# Patient Record
Sex: MALE | Race: WHITE | Employment: FULL TIME | URBAN - METROPOLITAN AREA
[De-identification: names, ages, dates, MRNs, and addresses within clinical notes are randomized per-mention and may not be internally consistent; named-entity substitution may affect disease eponyms.]

---

## 2017-02-27 ENCOUNTER — OFFICE VISIT (OUTPATIENT)
Dept: FAMILY MEDICINE CLINIC | Age: 62
End: 2017-02-27

## 2017-02-27 VITALS
WEIGHT: 225 LBS | DIASTOLIC BLOOD PRESSURE: 75 MMHG | BODY MASS INDEX: 30.48 KG/M2 | HEART RATE: 52 BPM | HEIGHT: 72 IN | SYSTOLIC BLOOD PRESSURE: 120 MMHG

## 2017-02-27 DIAGNOSIS — R53.83 FATIGUE, UNSPECIFIED TYPE: ICD-10-CM

## 2017-02-27 DIAGNOSIS — C09.9 TONSILLAR CANCER (HCC): ICD-10-CM

## 2017-02-27 DIAGNOSIS — I10 ESSENTIAL HYPERTENSION, BENIGN: Primary | ICD-10-CM

## 2017-02-27 DIAGNOSIS — R91.8 LUNG MASS: ICD-10-CM

## 2017-02-27 LAB
A/G RATIO: 1.9 (ref 1.1–2.2)
ALBUMIN SERPL-MCNC: 4.4 G/DL (ref 3.4–5)
ALP BLD-CCNC: 65 U/L (ref 40–129)
ALT SERPL-CCNC: 22 U/L (ref 10–40)
ANION GAP SERPL CALCULATED.3IONS-SCNC: 15 MMOL/L (ref 3–16)
AST SERPL-CCNC: 20 U/L (ref 15–37)
BILIRUB SERPL-MCNC: 0.6 MG/DL (ref 0–1)
BUN BLDV-MCNC: 20 MG/DL (ref 7–20)
CALCIUM SERPL-MCNC: 10.4 MG/DL (ref 8.3–10.6)
CHLORIDE BLD-SCNC: 100 MMOL/L (ref 99–110)
CHOLESTEROL, TOTAL: 154 MG/DL (ref 0–199)
CO2: 26 MMOL/L (ref 21–32)
CREAT SERPL-MCNC: 0.8 MG/DL (ref 0.8–1.3)
GFR AFRICAN AMERICAN: >60
GFR NON-AFRICAN AMERICAN: >60
GLOBULIN: 2.3 G/DL
GLUCOSE BLD-MCNC: 101 MG/DL (ref 70–99)
HDLC SERPL-MCNC: 51 MG/DL (ref 40–60)
LDL CHOLESTEROL CALCULATED: 81 MG/DL
POTASSIUM SERPL-SCNC: 4 MMOL/L (ref 3.5–5.1)
SODIUM BLD-SCNC: 141 MMOL/L (ref 136–145)
TOTAL PROTEIN: 6.7 G/DL (ref 6.4–8.2)
TRIGL SERPL-MCNC: 112 MG/DL (ref 0–150)
TSH REFLEX: 1.95 UIU/ML (ref 0.27–4.2)
VLDLC SERPL CALC-MCNC: 22 MG/DL

## 2017-02-27 PROCEDURE — 36415 COLL VENOUS BLD VENIPUNCTURE: CPT | Performed by: FAMILY MEDICINE

## 2017-02-27 PROCEDURE — 99214 OFFICE O/P EST MOD 30 MIN: CPT | Performed by: FAMILY MEDICINE

## 2017-02-27 RX ORDER — AMLODIPINE BESYLATE 5 MG/1
TABLET ORAL
Qty: 90 TABLET | Refills: 3 | Status: SHIPPED | OUTPATIENT
Start: 2017-02-27 | End: 2017-02-27 | Stop reason: SDUPTHER

## 2017-02-27 RX ORDER — AMLODIPINE BESYLATE 5 MG/1
TABLET ORAL
Qty: 90 TABLET | Refills: 3 | Status: SHIPPED | OUTPATIENT
Start: 2017-02-27 | End: 2018-02-17 | Stop reason: SDUPTHER

## 2017-04-20 ENCOUNTER — OFFICE VISIT (OUTPATIENT)
Dept: DERMATOLOGY | Age: 62
End: 2017-04-20

## 2017-04-20 DIAGNOSIS — L57.0 AK (ACTINIC KERATOSIS): Primary | ICD-10-CM

## 2017-04-20 PROCEDURE — 17000 DESTRUCT PREMALG LESION: CPT | Performed by: DERMATOLOGY

## 2017-04-20 PROCEDURE — 17003 DESTRUCT PREMALG LES 2-14: CPT | Performed by: DERMATOLOGY

## 2017-05-29 DIAGNOSIS — I10 ESSENTIAL HYPERTENSION, BENIGN: ICD-10-CM

## 2017-05-30 RX ORDER — TRIAMTERENE AND HYDROCHLOROTHIAZIDE 37.5; 25 MG/1; MG/1
CAPSULE ORAL
Qty: 90 CAPSULE | Refills: 3 | Status: SHIPPED | OUTPATIENT
Start: 2017-05-30 | End: 2018-05-02 | Stop reason: SDUPTHER

## 2017-08-28 ENCOUNTER — OFFICE VISIT (OUTPATIENT)
Dept: FAMILY MEDICINE CLINIC | Age: 62
End: 2017-08-28

## 2017-08-28 VITALS
HEIGHT: 72 IN | WEIGHT: 250 LBS | DIASTOLIC BLOOD PRESSURE: 67 MMHG | SYSTOLIC BLOOD PRESSURE: 110 MMHG | BODY MASS INDEX: 33.86 KG/M2 | HEART RATE: 56 BPM

## 2017-08-28 DIAGNOSIS — C09.9 TONSILLAR CANCER (HCC): ICD-10-CM

## 2017-08-28 DIAGNOSIS — E66.09 NON MORBID OBESITY DUE TO EXCESS CALORIES: ICD-10-CM

## 2017-08-28 DIAGNOSIS — I10 ESSENTIAL HYPERTENSION, BENIGN: Primary | ICD-10-CM

## 2017-08-28 LAB
A/G RATIO: 1.9 (ref 1.1–2.2)
ALBUMIN SERPL-MCNC: 4.5 G/DL (ref 3.4–5)
ALP BLD-CCNC: 69 U/L (ref 40–129)
ALT SERPL-CCNC: 26 U/L (ref 10–40)
ANION GAP SERPL CALCULATED.3IONS-SCNC: 14 MMOL/L (ref 3–16)
AST SERPL-CCNC: 24 U/L (ref 15–37)
BILIRUB SERPL-MCNC: 0.9 MG/DL (ref 0–1)
BUN BLDV-MCNC: 21 MG/DL (ref 7–20)
CALCIUM SERPL-MCNC: 10.4 MG/DL (ref 8.3–10.6)
CHLORIDE BLD-SCNC: 100 MMOL/L (ref 99–110)
CO2: 26 MMOL/L (ref 21–32)
CREAT SERPL-MCNC: 0.8 MG/DL (ref 0.8–1.3)
GFR AFRICAN AMERICAN: >60
GFR NON-AFRICAN AMERICAN: >60
GLOBULIN: 2.4 G/DL
GLUCOSE BLD-MCNC: 102 MG/DL (ref 70–99)
POTASSIUM SERPL-SCNC: 4.1 MMOL/L (ref 3.5–5.1)
SODIUM BLD-SCNC: 140 MMOL/L (ref 136–145)
TOTAL PROTEIN: 6.9 G/DL (ref 6.4–8.2)

## 2017-08-28 PROCEDURE — 99214 OFFICE O/P EST MOD 30 MIN: CPT | Performed by: FAMILY MEDICINE

## 2017-08-28 PROCEDURE — 36415 COLL VENOUS BLD VENIPUNCTURE: CPT | Performed by: FAMILY MEDICINE

## 2017-10-10 ASSESSMENT — LIFESTYLE VARIABLES: SMOKING_STATUS: 0

## 2017-10-11 ENCOUNTER — HOSPITAL ENCOUNTER (OUTPATIENT)
Dept: ENDOSCOPY | Age: 62
Discharge: HOME | End: 2017-10-11
Attending: INTERNAL MEDICINE | Admitting: INTERNAL MEDICINE

## 2017-10-11 VITALS
TEMPERATURE: 97.1 F | RESPIRATION RATE: 16 BRPM | WEIGHT: 237 LBS | BODY MASS INDEX: 33.18 KG/M2 | HEIGHT: 71 IN | DIASTOLIC BLOOD PRESSURE: 84 MMHG | OXYGEN SATURATION: 99 % | SYSTOLIC BLOOD PRESSURE: 129 MMHG | HEART RATE: 49 BPM

## 2017-10-11 RX ORDER — SODIUM CHLORIDE 0.9 % (FLUSH) 0.9 %
10 SYRINGE (ML) INJECTION PRN
Status: DISCONTINUED | OUTPATIENT
Start: 2017-10-11 | End: 2017-10-12 | Stop reason: HOSPADM

## 2017-10-11 RX ORDER — SODIUM CHLORIDE 0.9 % (FLUSH) 0.9 %
10 SYRINGE (ML) INJECTION EVERY 12 HOURS SCHEDULED
Status: DISCONTINUED | OUTPATIENT
Start: 2017-10-11 | End: 2017-10-12 | Stop reason: HOSPADM

## 2017-10-11 RX ORDER — SODIUM CHLORIDE 9 MG/ML
INJECTION, SOLUTION INTRAVENOUS CONTINUOUS
Status: DISCONTINUED | OUTPATIENT
Start: 2017-10-11 | End: 2017-10-12 | Stop reason: HOSPADM

## 2017-10-11 RX ADMIN — SODIUM CHLORIDE: 9 INJECTION, SOLUTION INTRAVENOUS at 11:00

## 2017-10-11 ASSESSMENT — PAIN SCALES - WONG BAKER: WONGBAKER_NUMERICALRESPONSE: 0

## 2017-10-11 ASSESSMENT — PAIN SCALES - GENERAL
PAINLEVEL_OUTOF10: 0
PAINLEVEL_OUTOF10: 0

## 2017-10-11 ASSESSMENT — PAIN - FUNCTIONAL ASSESSMENT: PAIN_FUNCTIONAL_ASSESSMENT: 0-10

## 2017-10-11 NOTE — ANESTHESIA POST-OP
Anesthesia Post-op Note    Patient: Chris Ndiaye  MRN: [id number]  YOB: 1955  Date of evaluation: 10/11/2017  Time:  12:55 PM       [facility] Department of Anesthesiology  Post-Anesthesia Note       Name:  Chris Ndiaye                                  Age:  58 y.o. MRN:  [id number]     Last Vitals & Oxygen Saturation: /84   Pulse (!) 49   Temp 97.1 °F (36.2 °C) (Temporal)   Resp 16   Ht 5' 11\" (1.803 m)   Wt 237 lb (107.5 kg)   SpO2 99%   BMI 33.05 kg/m²   Patient Vitals for the past 4 hrs:   BP Temp Temp src Pulse Resp SpO2 Height Weight   10/11/17 1246 129/84 - - (!) 49 16 99 % - -   10/11/17 1236 126/75 - - 53 16 99 % - -   10/11/17 1226 106/60 97.1 °F (36.2 °C) Temporal 50 16 98 % - -   10/11/17 1048 (!) 147/75 98.2 °F (36.8 °C) Temporal 64 16 97 % 5' 11\" (1.803 m) 237 lb (107.5 kg)       Level of consciousness:  Awake, alert    Respiratory: Respirations easy, no distress. Stable. Cardiovascular: Hemodynamically stable. Hydration: Adequate. PONV: Adequately managed. Post-op pain: Adequately controlled. Post-op assessment: Tolerated anesthetic well without complication. Complications:  None.     Gaye Talley MD  October 11, 2017   12:56 PM      Anesthesia Post Evaluation    Gaye Talley MD  12:55 PM

## 2017-10-11 NOTE — OP NOTE
Colonoscopy Procedure Note      Patient: Zora Barrios  : 1955  Acct#:     Procedure: Colonoscopy     Date:  10/11/2017    Surgeon:  Zoey Méndez DO    Referring Physician:  Dillon Kenny MD    Previous Colonoscopy: NO  Date: N/A  Greater than 3 years: N/A    Preoperative Diagnosis:  1) Screening colonoscopy- Average risk     Postoperative Diagnosis:  1) The visualized colonic mucosa was without visible abnormalities. Specifically, no colon polyps were identified. 2) Mild left sided diverticulosis was noted  3) Small internal hemorrhoids were noted. Consent:  The patient or their legal guardian has signed a consent, and is aware of the potential risks, benefits, alternatives, and potential complications of this procedure. These include, but are not limited to hemorrhage, bleeding, post procedural pain, perforation, phlebitis, aspiration, hypotension, hypoxia, cardiovascular events such as arryhthmia, and possibly death. Additionally, the possibility of missed colonic polyps and interval colon cancer was discussed in the consent. Anesthesia:  The patient was administered TIVA per anesthesiology team.  Please see their operative records for full details of medications administered. Procedure: An informed consent was obtained from the patient after explanation of indications, benefits, possible risks and complications of the procedure. The patient was then taken to the endoscopy suite, placed in the left lateral decubitus position, and the above IV anesthesia was administered. A digital rectal examination was performed and revealed negative without mass, lesions or tenderness, internal hemorrhoids noted. The Olympus video colonoscope was placed in the patient's rectum under digital direction and advanced to the cecum. The cecum was identified by characteristic anatomy and ballottment. The prep was good. The ileocecal valve was identified.      The terminal ileum was not visualized. The scope was then withdrawn back through the cecum, ascending, transverse, descending, sigmoid colon, and rectum. Careful circumferential examination of the mucosa in these areas demonstrated:    1) The visualized colonic mucosa was without visible abnormalities. Specifically, no colon polyps were identified. 2) Mild left sided diverticulosis was noted      The scope was then withdrawn into the rectum and retroflexed. The retroflexed view of the anal verge and rectum demonstrates small internal hemorrhoids. The scope was straightened, the colon was decompressed and the scope was withdrawn from the patient. The patient tolerated the procedure well and was taken to the PACU in good condition. Estimated blood loss: None    Impression:  See post-procedure diagnoses. Recommendations:  Repeat colonoscopy in 10 years.     Durell Harada, DO  [address] [place] [address]  10/11/2017  [phone]

## 2017-10-11 NOTE — ANESTHESIA PRE-OP
%  Min: 97 %   Min taken time: 10/11/17 1048  Max: 97 %   Max taken time: 10/11/17 1048    BP Readings from Last 3 Encounters:   10/11/17 (!) 147/75   08/28/17 110/67   02/27/17 120/75     BMI  Body mass index is 33.05 kg/m². Estimated body mass index is 33.05 kg/m² as calculated from the following:    Height as of this encounter: 5' 11\" (1.803 m). Weight as of this encounter: 237 lb (107.5 kg). CBC   Lab Results   Component Value Date    WBC 4.1 08/24/2016    RBC 4.45 08/24/2016    HGB 13.6 08/24/2016    HCT 39.0 08/24/2016    MCV 87.7 08/24/2016    RDW 12.6 08/24/2016     08/24/2016     CMP    Lab Results   Component Value Date     08/28/2017    K 4.1 08/28/2017     08/28/2017    CO2 26 08/28/2017    BUN 21 08/28/2017    CREATININE 0.8 08/28/2017    GFRAA >60 08/28/2017    AGRATIO 1.9 08/28/2017    LABGLOM >60 08/28/2017    GLUCOSE 102 08/28/2017    PROT 6.9 08/28/2017    CALCIUM 10.4 08/28/2017    BILITOT 0.9 08/28/2017    ALKPHOS 69 08/28/2017    AST 24 08/28/2017    ALT 26 08/28/2017     BMP    Lab Results   Component Value Date     08/28/2017    K 4.1 08/28/2017     08/28/2017    CO2 26 08/28/2017    BUN 21 08/28/2017    CREATININE 0.8 08/28/2017    CALCIUM 10.4 08/28/2017    GFRAA >60 08/28/2017    LABGLOM >60 08/28/2017    GLUCOSE 102 08/28/2017     POCGlucose  No results for input(s): GLUCOSE in the last 72 hours.    Coags  No results found for: PROTIME, INR, APTT  HCG (If Applicable) No results found for: PREGTESTUR, PREGSERUM, HCG, HCGQUANT   ABGs No results found for: PHART, PO2ART, VFU9QRH, XFT1HXY, BEART, W9XWMZQX   Type & Screen (If Applicable)  No results found for: LABABO, LABRH                         BMI: Wt Readings from Last 3 Encounters:       NPO Status:   Date of last liquid consumption: 10/11/17   Time of last liquid consumption: 0630   Date of last solid food consumption: 10/09/17              Anesthesia Evaluation  Patient summary reviewed no history of anesthetic complications:   Airway: Mallampati: II  TM distance: >3 FB   Neck ROM: full  Mouth opening: > = 3 FB Dental:    (+) partials      Pulmonary: breath sounds clear to auscultation      (-) COPD, asthma, recent URI and sleep apnea                           Cardiovascular:    (+) hypertension:,     (-) past MI, CABG/stent, dysrhythmias and  angina      Rhythm: regular  Rate: normal           Beta Blocker:  Not on Beta Blocker         Neuro/Psych:      (-) seizures, TIA and CVA           GI/Hepatic/Renal:        (-) GERD and liver disease       Endo/Other:    (+) : arthritis:. (-) hypothyroidism, no Type II DM               Abdominal:           Vascular:                                    Anesthesia Plan      MAC     ASA 3             Anesthetic plan and risks discussed with patient. Plan discussed with CRNA. This pre-anesthesia assessment may be used as a history and physical.    DOS STAFF ADDENDUM:    Pt seen and examined, chart reviewed (including anesthesia, drug and allergy history). No interval changes to history and physical examination. Anesthetic plan, risks, benefits, alternatives, and personnel involved discussed with patient. Patient verbalized an understanding and agrees to proceed.       Duane Connor, MD  October 11, 2017  11:03 AM

## 2018-02-17 DIAGNOSIS — I10 ESSENTIAL HYPERTENSION, BENIGN: ICD-10-CM

## 2018-02-19 RX ORDER — AMLODIPINE BESYLATE 5 MG/1
TABLET ORAL
Qty: 90 TABLET | Refills: 1 | Status: SHIPPED | OUTPATIENT
Start: 2018-02-19 | End: 2018-08-03 | Stop reason: SDUPTHER

## 2018-03-06 ENCOUNTER — OFFICE VISIT (OUTPATIENT)
Dept: FAMILY MEDICINE CLINIC | Age: 63
End: 2018-03-06

## 2018-03-06 VITALS
HEART RATE: 58 BPM | SYSTOLIC BLOOD PRESSURE: 130 MMHG | DIASTOLIC BLOOD PRESSURE: 75 MMHG | BODY MASS INDEX: 33.86 KG/M2 | HEIGHT: 72 IN | WEIGHT: 250 LBS

## 2018-03-06 DIAGNOSIS — C09.9 TONSILLAR CANCER (HCC): ICD-10-CM

## 2018-03-06 DIAGNOSIS — I10 ESSENTIAL HYPERTENSION, BENIGN: Primary | ICD-10-CM

## 2018-03-06 DIAGNOSIS — F43.21 ADJUSTMENT DISORDER WITH DEPRESSED MOOD: ICD-10-CM

## 2018-03-06 DIAGNOSIS — E66.09 NON MORBID OBESITY DUE TO EXCESS CALORIES: ICD-10-CM

## 2018-03-06 LAB
A/G RATIO: 2 (ref 1.1–2.2)
ALBUMIN SERPL-MCNC: 4.5 G/DL (ref 3.4–5)
ALP BLD-CCNC: 61 U/L (ref 40–129)
ALT SERPL-CCNC: 28 U/L (ref 10–40)
ANION GAP SERPL CALCULATED.3IONS-SCNC: 14 MMOL/L (ref 3–16)
AST SERPL-CCNC: 24 U/L (ref 15–37)
BILIRUB SERPL-MCNC: 0.6 MG/DL (ref 0–1)
BUN BLDV-MCNC: 19 MG/DL (ref 7–20)
CALCIUM SERPL-MCNC: 10 MG/DL (ref 8.3–10.6)
CHLORIDE BLD-SCNC: 99 MMOL/L (ref 99–110)
CO2: 25 MMOL/L (ref 21–32)
CREAT SERPL-MCNC: 0.8 MG/DL (ref 0.8–1.3)
GFR AFRICAN AMERICAN: >60
GFR NON-AFRICAN AMERICAN: >60
GLOBULIN: 2.3 G/DL
GLUCOSE BLD-MCNC: 92 MG/DL (ref 70–99)
POTASSIUM SERPL-SCNC: 3.9 MMOL/L (ref 3.5–5.1)
SODIUM BLD-SCNC: 138 MMOL/L (ref 136–145)
TOTAL PROTEIN: 6.8 G/DL (ref 6.4–8.2)

## 2018-03-06 PROCEDURE — G8484 FLU IMMUNIZE NO ADMIN: HCPCS | Performed by: FAMILY MEDICINE

## 2018-03-06 PROCEDURE — G8417 CALC BMI ABV UP PARAM F/U: HCPCS | Performed by: FAMILY MEDICINE

## 2018-03-06 PROCEDURE — 3017F COLORECTAL CA SCREEN DOC REV: CPT | Performed by: FAMILY MEDICINE

## 2018-03-06 PROCEDURE — G8427 DOCREV CUR MEDS BY ELIG CLIN: HCPCS | Performed by: FAMILY MEDICINE

## 2018-03-06 PROCEDURE — 99214 OFFICE O/P EST MOD 30 MIN: CPT | Performed by: FAMILY MEDICINE

## 2018-03-06 PROCEDURE — 1036F TOBACCO NON-USER: CPT | Performed by: FAMILY MEDICINE

## 2018-03-06 PROCEDURE — 36415 COLL VENOUS BLD VENIPUNCTURE: CPT | Performed by: FAMILY MEDICINE

## 2018-03-06 ASSESSMENT — PATIENT HEALTH QUESTIONNAIRE - PHQ9
2. FEELING DOWN, DEPRESSED OR HOPELESS: 0
SUM OF ALL RESPONSES TO PHQ QUESTIONS 1-9: 0
1. LITTLE INTEREST OR PLEASURE IN DOING THINGS: 0
SUM OF ALL RESPONSES TO PHQ9 QUESTIONS 1 & 2: 0

## 2018-03-06 ASSESSMENT — ENCOUNTER SYMPTOMS
VOMITING: 0
COUGH: 0
DIARRHEA: 0
CONSTIPATION: 0
NAUSEA: 0
SHORTNESS OF BREATH: 0
ABDOMINAL PAIN: 0

## 2018-03-06 NOTE — PROGRESS NOTES
Chief Complaint   Patient presents with    Hypertension         HPI:  Nichelle Corona is a 58 y.o. (: 1955) here today   for multiple medical problems. 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 Has been eating well and notes no trouble with food sticking in his throat or neck pain. He does admit his mood has been depressed as his son  2017. He was chronically ill but the death was sudden he notes he still able to function just doesn't care much about weight loss at this time. Denies suicidal or homicidal ideation    Review of Systems   Constitutional: Negative for chills and fever. Respiratory: Negative for cough and shortness of breath. Cardiovascular: Negative for chest pain and palpitations. Gastrointestinal: Negative for abdominal pain, constipation, diarrhea, nausea and vomiting. Endocrine: Negative for polyuria. Genitourinary: Negative for dysuria.        Past Medical History:   Diagnosis Date    Cancer [facility])     skin : [facility] Dr. Jose Ramon Phan Elevated liver enzymes 2014    Essential hypertension, benign     Lung mass 16    REpeat CT  improved  follows Dr. 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 Rafy Kimbrough Tonsillar cancer [facility]) 2015    [facility] - Dr. Ray Mcardle s/p chemo/rad     Family History   Problem Relation Age of Onset    Cancer Mother      Breast/Skin    Heart Disease Mother     Osteoporosis Mother     Other Father       78 suddenly/gout    Heart Disease Father      Social History     Social History    Marital status:      Spouse name: Lonita Litten Number of children: 2    Years of education: N/A     Occupational History    : [employer]      Social History Main Topics    Smoking status: Former Smoker     Quit date: 8/15/1983    Smokeless tobacco: Never Used    Alcohol use 0.0 oz/week      Comment: once a week    Drug use: No    Sexual activity: Not on file     Other Topics Concern    Not on file     Social History Narrative    No narrative on file       New Prescriptions    No medications on file         Meds Prior to visit:  Prior to Visit Medications    Medication Sig Taking? Authorizing Provider   amLODIPine (NORVASC) 5 MG tablet TAKE 1 TABLET BY MOUTH  DAILY Yes Ann Sotomayor MD   triamterene-hydrochlorothiazide (DYAZIDE) 37.5-25 MG per capsule Take 1 capsule by mouth  daily Yes Ann Sotomayor MD   Omega-3 Fatty Acids 1200 MG CAPS 1 EVERY DAY Yes Historical Provider, MD   Docusate Sodium 100 MG TABS 2 Every Morning Yes Historical Provider, MD   therapeutic multivitamin-minerals (THERAGRAN-M) tablet Take 1 tablet by mouth daily. Yes Historical Provider, MD     Allergies   Allergen Reactions    Neosporin [Neomycin-Polymyx-Gramicid]        OBJECTIVE:    /75   Pulse 58   Ht 5' 11.5\" (1.816 m)   Wt 250 lb (113.4 kg)   BMI 34.38 kg/m²   BP Readings from Last 2 Encounters:   18 130/75   10/11/17 129/84     Wt Readings from Last 3 Encounters:   18 250 lb (113.4 kg)   10/11/17 237 lb (107.5 kg)   10/05/17 245 lb (111.1 kg)       Physical Exam   Constitutional: He appears well-developed and well-nourished. Obese   Cardiovascular: Normal rate and regular rhythm. Exam reveals no gallop and no friction rub. No murmur heard. Pulmonary/Chest: Effort normal and breath sounds normal. He has no wheezes. He has no rales. Abdominal: Soft.  Bowel sounds are normal. He exhibits no distension and no mass. There is no tenderness. Skin: Skin is warm and dry. No rash noted. Vitals reviewed. LDL Calculated (mg/dL)   Date Value   02/27/2017 81       ASSESSMENT/PLAN:    1. Essential hypertension, benign  Controlled: Appears stable. We will continue current management and monitor for adverse reaction and disease progression. Follow-up as noted below    - Comprehensive Metabolic Panel    2. Tonsillar cancer ([address]  Appears stable: Keep follow-up with radiation oncology in April and his ENT in October    3. Non morbid obesity due to excess calories  Counseled patient to eat mindfully follow-up in 6 months    4. Adjustment disorder with depressed mood  Patient declines antidepressants today. Gave some brief counseling follow-up 6 months sooner if needed      RTC 6 months    Scribe attestation:  Delmy Jackson MA, am scribing for and in the presence of Galen Dejesus MD. Electronically signed by Diane Merino MA on 3/6/2018 at 4:16 PM            Provider attestation: Holly Robledo MD  personally performed the services described in this documentation, as scribed by the user listed above in my presence, and it is both accurate and complete. I agree with the Chief Complaint, ROS, and Past Histories independently gathered by the clinical support staff and the remaining scribed note accurately describes my personal service to the patient.     3/6/2018    4:46 PM

## 2018-04-23 ENCOUNTER — OFFICE VISIT (OUTPATIENT)
Dept: DERMATOLOGY | Age: 63
End: 2018-04-23

## 2018-04-23 DIAGNOSIS — L82.1 SK (SEBORRHEIC KERATOSIS): Primary | ICD-10-CM

## 2018-04-23 DIAGNOSIS — L57.0 AK (ACTINIC KERATOSIS): ICD-10-CM

## 2018-04-23 DIAGNOSIS — L30.8 ASTEATOTIC DERMATITIS: ICD-10-CM

## 2018-04-23 PROCEDURE — G8427 DOCREV CUR MEDS BY ELIG CLIN: HCPCS | Performed by: DERMATOLOGY

## 2018-04-23 PROCEDURE — G8417 CALC BMI ABV UP PARAM F/U: HCPCS | Performed by: DERMATOLOGY

## 2018-04-23 PROCEDURE — 3017F COLORECTAL CA SCREEN DOC REV: CPT | Performed by: DERMATOLOGY

## 2018-04-23 PROCEDURE — 1036F TOBACCO NON-USER: CPT | Performed by: DERMATOLOGY

## 2018-04-23 PROCEDURE — 99213 OFFICE O/P EST LOW 20 MIN: CPT | Performed by: DERMATOLOGY

## 2018-04-23 RX ORDER — TRIAMCINOLONE ACETONIDE 1 MG/G
CREAM TOPICAL
Qty: 80 G | Refills: 1 | Status: SHIPPED | OUTPATIENT
Start: 2018-04-23 | End: 2019-09-05 | Stop reason: ALTCHOICE

## 2018-05-02 DIAGNOSIS — I10 ESSENTIAL HYPERTENSION, BENIGN: ICD-10-CM

## 2018-05-03 RX ORDER — TRIAMTERENE AND HYDROCHLOROTHIAZIDE 37.5; 25 MG/1; MG/1
CAPSULE ORAL
Qty: 90 CAPSULE | Refills: 1 | Status: SHIPPED | OUTPATIENT
Start: 2018-05-03 | End: 2018-10-15 | Stop reason: SDUPTHER

## 2018-08-03 DIAGNOSIS — I10 ESSENTIAL HYPERTENSION, BENIGN: ICD-10-CM

## 2018-08-05 RX ORDER — AMLODIPINE BESYLATE 5 MG/1
TABLET ORAL
Qty: 90 TABLET | Refills: 0 | Status: SHIPPED | OUTPATIENT
Start: 2018-08-05 | End: 2018-09-06 | Stop reason: SDUPTHER

## 2018-08-29 ENCOUNTER — TELEPHONE (OUTPATIENT)
Dept: FAMILY MEDICINE CLINIC | Age: 63
End: 2018-08-29

## 2018-09-06 ENCOUNTER — OFFICE VISIT (OUTPATIENT)
Dept: FAMILY MEDICINE CLINIC | Age: 63
End: 2018-09-06

## 2018-09-06 VITALS
SYSTOLIC BLOOD PRESSURE: 132 MMHG | BODY MASS INDEX: 35.7 KG/M2 | DIASTOLIC BLOOD PRESSURE: 74 MMHG | WEIGHT: 255 LBS | HEART RATE: 58 BPM | HEIGHT: 71 IN

## 2018-09-06 DIAGNOSIS — E66.09 NON MORBID OBESITY DUE TO EXCESS CALORIES: ICD-10-CM

## 2018-09-06 DIAGNOSIS — I10 ESSENTIAL HYPERTENSION, BENIGN: Primary | ICD-10-CM

## 2018-09-06 DIAGNOSIS — C09.9 TONSILLAR CANCER (HCC): ICD-10-CM

## 2018-09-06 DIAGNOSIS — Z23 NEED FOR VACCINATION: ICD-10-CM

## 2018-09-06 LAB
A/G RATIO: 2 (ref 1.1–2.2)
ALBUMIN SERPL-MCNC: 4.7 G/DL (ref 3.4–5)
ALP BLD-CCNC: 64 U/L (ref 40–129)
ALT SERPL-CCNC: 23 U/L (ref 10–40)
ANION GAP SERPL CALCULATED.3IONS-SCNC: 12 MMOL/L (ref 3–16)
AST SERPL-CCNC: 21 U/L (ref 15–37)
BILIRUB SERPL-MCNC: 0.6 MG/DL (ref 0–1)
BUN BLDV-MCNC: 21 MG/DL (ref 7–20)
CALCIUM SERPL-MCNC: 10.5 MG/DL (ref 8.3–10.6)
CHLORIDE BLD-SCNC: 102 MMOL/L (ref 99–110)
CHOLESTEROL, TOTAL: 159 MG/DL (ref 0–199)
CO2: 27 MMOL/L (ref 21–32)
CREAT SERPL-MCNC: 0.9 MG/DL (ref 0.8–1.3)
GFR AFRICAN AMERICAN: >60
GFR NON-AFRICAN AMERICAN: >60
GLOBULIN: 2.3 G/DL
GLUCOSE BLD-MCNC: 98 MG/DL (ref 70–99)
HDLC SERPL-MCNC: 44 MG/DL (ref 40–60)
LDL CHOLESTEROL CALCULATED: 88 MG/DL
POTASSIUM SERPL-SCNC: 3.9 MMOL/L (ref 3.5–5.1)
SODIUM BLD-SCNC: 141 MMOL/L (ref 136–145)
TOTAL PROTEIN: 7 G/DL (ref 6.4–8.2)
TRIGL SERPL-MCNC: 134 MG/DL (ref 0–150)
VLDLC SERPL CALC-MCNC: 27 MG/DL

## 2018-09-06 PROCEDURE — 99214 OFFICE O/P EST MOD 30 MIN: CPT | Performed by: FAMILY MEDICINE

## 2018-09-06 PROCEDURE — 36415 COLL VENOUS BLD VENIPUNCTURE: CPT | Performed by: FAMILY MEDICINE

## 2018-09-06 PROCEDURE — 3017F COLORECTAL CA SCREEN DOC REV: CPT | Performed by: FAMILY MEDICINE

## 2018-09-06 PROCEDURE — G8427 DOCREV CUR MEDS BY ELIG CLIN: HCPCS | Performed by: FAMILY MEDICINE

## 2018-09-06 PROCEDURE — 1036F TOBACCO NON-USER: CPT | Performed by: FAMILY MEDICINE

## 2018-09-06 PROCEDURE — G8417 CALC BMI ABV UP PARAM F/U: HCPCS | Performed by: FAMILY MEDICINE

## 2018-09-06 RX ORDER — AMLODIPINE BESYLATE 5 MG/1
5 TABLET ORAL DAILY
Qty: 90 TABLET | Refills: 3 | Status: SHIPPED | OUTPATIENT
Start: 2018-09-06 | End: 2018-10-24 | Stop reason: SDUPTHER

## 2018-09-06 ASSESSMENT — ENCOUNTER SYMPTOMS
NAUSEA: 0
ABDOMINAL PAIN: 0
DIARRHEA: 0
COUGH: 0
CONSTIPATION: 0
SHORTNESS OF BREATH: 0
VOMITING: 0

## 2018-09-06 NOTE — PROGRESS NOTES
Chief Complaint   Patient presents with    Cancer     tonsilar    Hypertension    Obesity         HPI:  Arielle Pearl is a 61 y.o. (: 1955) here today   for review of multiple medical problems. He is compliant with his blood pressure medications  without Lightheadedness, Urinary Frequency, Edema and Sedation  He is not checking Home Blood Pressures. He notes he starting to consider working on his diet. Notes he has been keeping f/u with ENT and Radiation Onc for his tonsillar cancer. He has been having no problems swallowing. Review of Systems   Constitutional: Negative for chills and fever. Respiratory: Negative for cough and shortness of breath. Cardiovascular: Negative for chest pain and palpitations. Gastrointestinal: Negative for abdominal pain, constipation, diarrhea, nausea and vomiting. Endocrine: Negative for polyuria. Genitourinary: Negative for dysuria.        Past Medical History:   Diagnosis Date    Adjustment disorder with depressed mood 3/6/2018    Cancer ([facility] [address]     skin : [facility] Dr. Pina Yuan Elevated liver enzymes 2014    Essential hypertension, benign     Lung mass 16    REpeat CT  improved  follows Dr. 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 Nelly Rodriguez Tonsillar cancer [facility]) 2015    [facility] - Dr. Murtaza Lang s/p chemo/rad     Family History   Problem Relation Age of Onset    Cancer Mother         Breast/Skin    Heart Disease Mother     Osteoporosis Mother     Other Father          78 suddenly/gout    Heart Disease Father     Other Son         Chronically ill:  2017     Social History     Social History    Marital status:      Spouse name: Ana Score Number of children: 2    Years of education: N/A     Occupational History    : [employer]      Social History Main Topics    Smoking status: Former Smoker     Quit date: 8/15/1983    Smokeless tobacco: Never Used    Alcohol use 0.0 oz/week      Comment: once a week    Drug use: No    Sexual activity: Not on file     Other Topics Concern    Not on file     Social History Narrative    No narrative on file       New Prescriptions    No medications on file         Meds Prior to visit:  Prior to Visit Medications    Medication Sig Taking? Authorizing Provider   amLODIPine (NORVASC) 5 MG tablet TAKE 1 TABLET BY MOUTH  DAILY Yes Mark Ozuna MD   triamterene-hydrochlorothiazide (DYAZIDE) 37.5-25 MG per capsule TAKE 1 CAPSULE BY MOUTH  DAILY Yes Aurea Pérez MD   triamcinolone (KENALOG) 0.1 % cream Apply to affected area twice daily for up to 2 weeks or until improved. Yes Kristan Goltz, MD   Omega-3 Fatty Acids 1200 MG CAPS 1 EVERY DAY Yes Historical Provider, MD   Docusate Sodium 100 MG TABS 2 Every Morning Yes Historical Provider, MD   therapeutic multivitamin-minerals (THERAGRAN-M) tablet Take 1 tablet by mouth daily. Yes Historical Provider, MD     Allergies   Allergen Reactions    Neosporin [Neomycin-Polymyx-Gramicid]        OBJECTIVE:    /74   Pulse 58   Ht 5' 11\" (1.803 m)   Wt 255 lb (115.7 kg)   BMI 35.57 kg/m²   BP Readings from Last 2 Encounters:   09/06/18 132/74   03/06/18 130/75     Wt Readings from Last 3 Encounters:   09/06/18 255 lb (115.7 kg)   03/06/18 250 lb (113.4 kg)   10/11/17 237 lb (107.5 kg)       Physical Exam   Constitutional: He appears well-developed and well-nourished.    HENT:   Right Ear: Tympanic membrane and ear canal normal.   Left Ear: Tympanic membrane and ear canal normal.   Nose: Nose normal.   Mouth/Throat: Uvula is midline, oropharynx is clear and moist and mucous membranes are normal.   Nares Pink and Patent,   Eyes: Pupils are equal, round,

## 2018-10-15 DIAGNOSIS — I10 ESSENTIAL HYPERTENSION, BENIGN: ICD-10-CM

## 2018-10-16 RX ORDER — TRIAMTERENE AND HYDROCHLOROTHIAZIDE 37.5; 25 MG/1; MG/1
CAPSULE ORAL
Qty: 90 CAPSULE | Refills: 3 | Status: SHIPPED | OUTPATIENT
Start: 2018-10-16 | End: 2020-03-05 | Stop reason: SDUPTHER

## 2018-10-24 DIAGNOSIS — I10 ESSENTIAL HYPERTENSION, BENIGN: ICD-10-CM

## 2018-10-25 RX ORDER — AMLODIPINE BESYLATE 5 MG/1
5 TABLET ORAL DAILY
Qty: 90 TABLET | Refills: 1 | Status: SHIPPED | OUTPATIENT
Start: 2018-10-25 | End: 2019-05-24 | Stop reason: SDUPTHER

## 2019-06-13 ENCOUNTER — OFFICE VISIT (OUTPATIENT)
Dept: DERMATOLOGY | Age: 64
End: 2019-06-13
Payer: COMMERCIAL

## 2019-06-13 DIAGNOSIS — L57.0 AK (ACTINIC KERATOSIS): Primary | ICD-10-CM

## 2019-06-13 DIAGNOSIS — Z85.828 HISTORY OF SCC (SQUAMOUS CELL CARCINOMA) OF SKIN: ICD-10-CM

## 2019-06-13 PROCEDURE — 1036F TOBACCO NON-USER: CPT | Performed by: DERMATOLOGY

## 2019-06-13 PROCEDURE — 3017F COLORECTAL CA SCREEN DOC REV: CPT | Performed by: DERMATOLOGY

## 2019-06-13 PROCEDURE — 99213 OFFICE O/P EST LOW 20 MIN: CPT | Performed by: DERMATOLOGY

## 2019-06-13 PROCEDURE — G8427 DOCREV CUR MEDS BY ELIG CLIN: HCPCS | Performed by: DERMATOLOGY

## 2019-06-13 PROCEDURE — G8417 CALC BMI ABV UP PARAM F/U: HCPCS | Performed by: DERMATOLOGY

## 2019-08-16 ENCOUNTER — HOSPITAL ENCOUNTER (OUTPATIENT)
Dept: PHYSICAL THERAPY | Age: 64
Setting detail: THERAPIES SERIES
Discharge: HOME | End: 2019-08-16

## 2019-08-16 NOTE — FLOWSHEET NOTE
Notable tightness in hamstrings, poor SLB balance, decreased hip IR ROM, Poor left glute/hip strength    Prognosis: [x] Good [] Fair  [] Poor    Patient Requires Follow-up: [] Yes  [] No    Plan:   [x] Continue per plan of care [] Alter current plan (see comments)  [] Plan of care initiated [] Hold pending MD visit [] Discharge     Plan for Next Session:  Review current exercises, video analysis of swing/swing characteristics.   Progress lower quarter rotations, add upper body rotation (A frame), add open books  Address findings in video appropriately     Next Follow-up: 8/29 in [place]    Electronically signed by:      Jose Burris PT #[id number]

## 2019-08-29 ENCOUNTER — HOSPITAL ENCOUNTER (OUTPATIENT)
Dept: PHYSICAL THERAPY | Age: 64
Setting detail: THERAPIES SERIES
Discharge: HOME | End: 2019-08-29

## 2019-10-04 ENCOUNTER — HOSPITAL ENCOUNTER (OUTPATIENT)
Dept: PHYSICAL THERAPY | Age: 64
Setting detail: THERAPIES SERIES
Discharge: HOME | End: 2019-10-04

## 2020-07-06 ENCOUNTER — OFFICE VISIT (OUTPATIENT)
Dept: DERMATOLOGY | Age: 65
End: 2020-07-06
Payer: COMMERCIAL

## 2020-07-06 VITALS — TEMPERATURE: 98.1 F

## 2020-07-06 PROCEDURE — 99213 OFFICE O/P EST LOW 20 MIN: CPT | Performed by: DERMATOLOGY

## 2020-07-06 PROCEDURE — 17003 DESTRUCT PREMALG LES 2-14: CPT | Performed by: DERMATOLOGY

## 2020-07-06 PROCEDURE — 17000 DESTRUCT PREMALG LESION: CPT | Performed by: DERMATOLOGY

## 2020-07-06 RX ORDER — TRIAMCINOLONE ACETONIDE 1 MG/G
CREAM TOPICAL
Qty: 80 G | Refills: 1 | Status: SHIPPED | OUTPATIENT
Start: 2020-07-06 | End: 2022-09-13 | Stop reason: SDUPTHER

## 2020-07-06 NOTE — PROGRESS NOTES
[facility]  Gadiel Amezcua MD  [phone]      Samuel Cheatham  1955    59 y.o. male     Date of Visit: 7/6/2020    Chief Complaint: skin lesions    History of Present Illness:    1. He complains of few persistent scaly lesions on the forehead and right lower lip. 2.  He also complains of a stable asymptomatic lesion on the right upper chest.    3.  He complains of a painless lesion on the left lower cheek. 4.  He has a history of dermatitis on the legs-improved with use of triamcinolone 0.1% cream.       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 Jose Luis Garcia. History of SCC in situ on the back status post excision 10/2013.       Review of Systems:  Skin: No new or changing moles. Past Medical History, Family History, Surgical History, Medications and Allergies reviewed.     Past Medical History:   Diagnosis Date    Adjustment disorder with depressed mood 3/6/2018    Cancer ([facility] [address]     skin : [facility] Dr. Carolyn Lynch Elevated liver enzymes 6/30/2014    Essential hypertension, benign     Lung mass 1/6/16    REpeat CT 4/19 improved  follows Dr. Santhosh Dunne at [address] [place] [address] Ih-10 polyarthritis 4/26/2016    Neoplasm of uncertain behavior of skin 9/22/2014    Non morbid obesity due to excess calories 8/28/2017    Pseudogout     Renal mass, right     found on CT chest 4/19/16  - Cyst on PET scan    S/P colonoscopy     Dr. Susen Lennox Tonsillar cancer [facility]) 12/8/2015    [facility] - Dr. Abigail Bird s/p chemo/rad     Past Surgical History:   Procedure Laterality Date    CHOLECYSTECTOMY      FOOT SURGERY      KNEE ARTHROSCOPY Right 1/21/2006    Dr. Shirley Drake: R knee     KNEE ARTHROSCOPY Left 2008       Allergies   Allergen Reactions    Neosporin [Neomycin-Polymyx-Gramicid]      Outpatient Medications Marked as Taking for the 7/6/20 encounter (Office Visit) with Sim Mishra MD   Medication Sig Dispense Refill    triamcinolone (KENALOG) Normal vision: sees adequately in most situations; can see medication labels, newsprint

## 2020-07-21 ENCOUNTER — OFFICE VISIT (OUTPATIENT)
Dept: FAMILY MEDICINE CLINIC | Age: 65
End: 2020-07-21
Payer: COMMERCIAL

## 2020-07-21 VITALS
TEMPERATURE: 97.7 F | BODY MASS INDEX: 33.52 KG/M2 | DIASTOLIC BLOOD PRESSURE: 88 MMHG | WEIGHT: 239.4 LBS | SYSTOLIC BLOOD PRESSURE: 138 MMHG | HEIGHT: 71 IN

## 2020-07-21 PROCEDURE — 99214 OFFICE O/P EST MOD 30 MIN: CPT | Performed by: FAMILY MEDICINE

## 2020-07-21 ASSESSMENT — ENCOUNTER SYMPTOMS
SINUS PRESSURE: 0
WHEEZING: 0
ABDOMINAL PAIN: 0
RHINORRHEA: 0
VOMITING: 0
DIARRHEA: 0
EYE DISCHARGE: 0
CHEST TIGHTNESS: 0
TROUBLE SWALLOWING: 0
SHORTNESS OF BREATH: 0
SORE THROAT: 0
COUGH: 0
NAUSEA: 0

## 2020-07-21 NOTE — PROGRESS NOTES
2020     Sherlyn Carter (:  1955) is a 72 y.o. male, here for evaluation of the following medical concerns:    HPI    1. Establish care- patient presented to establish care with pcp. Patient is feeling well today and doesn't have a new complaint. The patient does drink alcohol, stopped using tobacco in , occasional alcohol use. Patient did have a nan mass, and possible tonsil SCC? Had chemoradiation 3.5 years ago, follows with oncology. 2.  HTN- well controlled today, taking his medications as prescribed, needing labs today, denied headache, vision change, or dizziness. Today, denied chest pain, sob, n, v,or diarrhea. Review of Systems   Constitutional: Negative for activity change, fatigue, fever and unexpected weight change. HENT: Negative for congestion, ear pain, rhinorrhea, sinus pressure, sore throat and trouble swallowing. Eyes: Negative for discharge and visual disturbance. Respiratory: Negative for cough, chest tightness, shortness of breath and wheezing. Cardiovascular: Negative for chest pain, palpitations and leg swelling. Gastrointestinal: Negative for abdominal pain, diarrhea, nausea and vomiting. Endocrine: Negative for cold intolerance, heat intolerance, polydipsia and polyphagia. Genitourinary: Negative for dysuria and frequency. Musculoskeletal: Negative for arthralgias. Skin: Negative for rash. Neurological: Negative for dizziness, syncope, light-headedness and headaches. Psychiatric/Behavioral: Negative for dysphoric mood. The patient is not nervous/anxious. Prior to Visit Medications    Medication Sig Taking? Authorizing Provider   triamcinolone (KENALOG) 0.1 % cream Apply to affected area twice daily for up to 2 weeks or until improved.  Yes Jonatan Mccollum MD   amLODIPine (NORVASC) 5 MG tablet Take 1 tablet by mouth daily Yes Stephane Duran MD   triamterene-hydroCHLOROthiazide (DYAZIDE) 37.5-25 MG per capsule TAKE 1 CAPSULE BY no abdominal tenderness. Lymphadenopathy:      Cervical: No cervical adenopathy. Skin:     Findings: No rash. Neurological:      Mental Status: He is alert and oriented to person, place, and time. Cranial Nerves: No cranial nerve deficit. Deep Tendon Reflexes: Reflexes normal.   Psychiatric:         Behavior: Behavior normal.         Judgment: Judgment normal.         ASSESSMENT/PLAN:  1. Encounter to establish care  Care established  Medications reviewed  Questions answered  Labs obtained  RTC in three months for follow up. 2. Essential hypertension, benign  Difficult and poorly controlled. Discussed signs and symptoms for immediate evaluation in the ER. Reduce sodium. Monitor diet, exercise and lose weight. Keep a BP log and bring it to your next appointment. Needs to rtc in one month for BP check  - CBC Auto Differential; Future  - Comprehensive Metabolic Panel; Future    *spent juan pablo. 25 minutes discussed history and educating on medication. Answered questions and reassured the patient. Return in about 6 months (around 1/21/2021).

## 2020-08-04 DIAGNOSIS — I10 ESSENTIAL HYPERTENSION, BENIGN: ICD-10-CM

## 2020-08-04 LAB
A/G RATIO: 2.1 (ref 1.1–2.2)
ALBUMIN SERPL-MCNC: 4.6 G/DL (ref 3.4–5)
ALP BLD-CCNC: 61 U/L (ref 40–129)
ALT SERPL-CCNC: 20 U/L (ref 10–40)
ANION GAP SERPL CALCULATED.3IONS-SCNC: 13 MMOL/L (ref 3–16)
AST SERPL-CCNC: 19 U/L (ref 15–37)
BASOPHILS ABSOLUTE: 0 K/UL (ref 0–0.2)
BASOPHILS RELATIVE PERCENT: 0.3 %
BILIRUB SERPL-MCNC: 0.6 MG/DL (ref 0–1)
BUN BLDV-MCNC: 19 MG/DL (ref 7–20)
CALCIUM SERPL-MCNC: 10.2 MG/DL (ref 8.3–10.6)
CHLORIDE BLD-SCNC: 101 MMOL/L (ref 99–110)
CO2: 28 MMOL/L (ref 21–32)
CREAT SERPL-MCNC: 0.8 MG/DL (ref 0.8–1.3)
EOSINOPHILS ABSOLUTE: 0.1 K/UL (ref 0–0.6)
EOSINOPHILS RELATIVE PERCENT: 1.3 %
GFR AFRICAN AMERICAN: >60
GFR NON-AFRICAN AMERICAN: >60
GLOBULIN: 2.2 G/DL
GLUCOSE BLD-MCNC: 91 MG/DL (ref 70–99)
HCT VFR BLD CALC: 44 % (ref 40.5–52.5)
HEMOGLOBIN: 15 G/DL (ref 13.5–17.5)
LYMPHOCYTES ABSOLUTE: 1.3 K/UL (ref 1–5.1)
LYMPHOCYTES RELATIVE PERCENT: 18 %
MCH RBC QN AUTO: 30.4 PG (ref 26–34)
MCHC RBC AUTO-ENTMCNC: 34.1 G/DL (ref 31–36)
MCV RBC AUTO: 89.1 FL (ref 80–100)
MONOCYTES ABSOLUTE: 0.7 K/UL (ref 0–1.3)
MONOCYTES RELATIVE PERCENT: 9.7 %
NEUTROPHILS ABSOLUTE: 5.1 K/UL (ref 1.7–7.7)
NEUTROPHILS RELATIVE PERCENT: 70.7 %
PDW BLD-RTO: 13 % (ref 12.4–15.4)
PLATELET # BLD: 162 K/UL (ref 135–450)
PMV BLD AUTO: 9.6 FL (ref 5–10.5)
POTASSIUM SERPL-SCNC: 4.2 MMOL/L (ref 3.5–5.1)
RBC # BLD: 4.94 M/UL (ref 4.2–5.9)
SODIUM BLD-SCNC: 142 MMOL/L (ref 136–145)
TOTAL PROTEIN: 6.8 G/DL (ref 6.4–8.2)
WBC # BLD: 7.2 K/UL (ref 4–11)

## 2021-02-12 DIAGNOSIS — I10 ESSENTIAL HYPERTENSION, BENIGN: ICD-10-CM

## 2021-02-15 RX ORDER — TRIAMTERENE AND HYDROCHLOROTHIAZIDE 37.5; 25 MG/1; MG/1
CAPSULE ORAL
Qty: 90 CAPSULE | Refills: 3 | OUTPATIENT
Start: 2021-02-15

## 2021-02-15 RX ORDER — AMLODIPINE BESYLATE 5 MG/1
5 TABLET ORAL DAILY
Qty: 90 TABLET | Refills: 3 | OUTPATIENT
Start: 2021-02-15

## 2021-03-02 ENCOUNTER — OFFICE VISIT (OUTPATIENT)
Dept: FAMILY MEDICINE CLINIC | Age: 66
End: 2021-03-02
Payer: COMMERCIAL

## 2021-03-02 ENCOUNTER — IMMUNIZATION (OUTPATIENT)
Dept: PRIMARY CARE CLINIC | Age: 66
End: 2021-03-02
Payer: COMMERCIAL

## 2021-03-02 VITALS
HEIGHT: 71 IN | DIASTOLIC BLOOD PRESSURE: 88 MMHG | SYSTOLIC BLOOD PRESSURE: 144 MMHG | BODY MASS INDEX: 34.75 KG/M2 | TEMPERATURE: 97.4 F | WEIGHT: 248.2 LBS

## 2021-03-02 DIAGNOSIS — I10 ESSENTIAL HYPERTENSION, BENIGN: ICD-10-CM

## 2021-03-02 DIAGNOSIS — C09.9 TONSILLAR CANCER (HCC): Primary | ICD-10-CM

## 2021-03-02 PROCEDURE — 0001A COVID-19, PFIZER VACCINE 30MCG/0.3ML DOSE: CPT | Performed by: FAMILY MEDICINE

## 2021-03-02 PROCEDURE — 99214 OFFICE O/P EST MOD 30 MIN: CPT | Performed by: FAMILY MEDICINE

## 2021-03-02 PROCEDURE — 91300 COVID-19, PFIZER VACCINE 30MCG/0.3ML DOSE: CPT | Performed by: FAMILY MEDICINE

## 2021-03-02 RX ORDER — TRIAMTERENE AND HYDROCHLOROTHIAZIDE 37.5; 25 MG/1; MG/1
CAPSULE ORAL
Qty: 90 CAPSULE | Refills: 3 | Status: SHIPPED | OUTPATIENT
Start: 2021-03-02 | End: 2021-08-25 | Stop reason: SDUPTHER

## 2021-03-02 RX ORDER — AMLODIPINE BESYLATE 5 MG/1
5 TABLET ORAL DAILY
Qty: 90 TABLET | Refills: 3 | Status: SHIPPED | OUTPATIENT
Start: 2021-03-02 | End: 2021-08-25 | Stop reason: SDUPTHER

## 2021-03-02 NOTE — PROGRESS NOTES
3/2/2021     Adria Hernandez (:  1955) is a 72 y.o. male, here for evaluation of the following medical concerns:    HPI     1. Hx of tonsilar cancer- started to have the discomfort several weeks ago, no sore throat, clearing throat, no voice changing, dysphagia, patient requesting to see Dr. Arturo Ramires at [facility].    2.  HTN- sebastian today, taking his medications as prescribed but has been out of this for the past several days, not needing labs today, denied headache, vision change, or dizziness. 3.  Repeat colonoscopy in 10 years.      Today, denied chest pain, sob, n, v,or diarrhea. Review of Systems   Constitutional: Negative for activity change, fatigue, fever and unexpected weight change. HENT: Positive for trouble swallowing and voice change. Negative for congestion, ear pain, facial swelling, hearing loss, rhinorrhea, sinus pressure and sore throat. Eyes: Negative for discharge. Respiratory: Negative for cough, chest tightness, shortness of breath and wheezing. Cardiovascular: Negative for chest pain, palpitations and leg swelling. Gastrointestinal: Negative for abdominal pain, anal bleeding, diarrhea, nausea and vomiting. Endocrine: Negative for cold intolerance, heat intolerance, polydipsia and polyphagia. Musculoskeletal: Negative for arthralgias. Skin: Negative for rash. Neurological: Negative for dizziness, syncope, light-headedness and headaches. Psychiatric/Behavioral: Negative for dysphoric mood. The patient is not nervous/anxious. Prior to Visit Medications    Medication Sig Taking? Authorizing Provider   amLODIPine (NORVASC) 5 MG tablet Take 1 tablet by mouth daily Yes Jim Muniz, DO   triamterene-hydroCHLOROthiazide (DYAZIDE) 37.5-25 MG per capsule TAKE 1 CAPSULE BY MOUTH  DAILY Yes Jim Muniz, DO   triamcinolone (KENALOG) 0.1 % cream Apply to affected area twice daily for up to 2 weeks or until improved.   Suze Allen MD   Glucosamine-Chondroitin (MOVE FREE PO) Take by mouth  Historical Provider, MD   Omega-3 Fatty Acids 1200 MG CAPS 1 EVERY DAY  Historical Provider, MD   Docusate Sodium 100 MG TABS 2 Every Morning  Historical Provider, MD   therapeutic multivitamin-minerals (THERAGRAN-M) tablet Take 1 tablet by mouth daily. Historical Provider, MD        Social History     Tobacco Use    Smoking status: Former Smoker     Packs/day: 1.00     Years: 28.00     Pack years: 28.00     Types: Cigarettes     Start date: 1967     Quit date: 8/15/1983     Years since quittin.5    Smokeless tobacco: Never Used   Substance Use Topics    Alcohol use: Yes     Alcohol/week: 0.0 standard drinks     Comment: once a week        Vitals:    21 1006 21 1029   BP: (!) 160/90 (!) 144/88   Temp: 97.4 °F (36.3 °C)    Weight: 248 lb 3.2 oz (112.6 kg)    Height: 5' 11\" (1.803 m)      Estimated body mass index is 34.62 kg/m² as calculated from the following:    Height as of this encounter: 5' 11\" (1.803 m). Weight as of this encounter: 248 lb 3.2 oz (112.6 kg). Physical Exam  Vitals signs and nursing note reviewed. Constitutional:       Appearance: He is well-developed. HENT:      Head: Normocephalic and atraumatic. Right Ear: External ear normal.      Left Ear: External ear normal.   Neck:      Thyroid: No thyromegaly. Cardiovascular:      Rate and Rhythm: Normal rate and regular rhythm. Heart sounds: No murmur. Pulmonary:      Effort: Pulmonary effort is normal.      Breath sounds: Normal breath sounds. No wheezing or rales. Abdominal:      General: Bowel sounds are normal.      Palpations: Abdomen is soft. Tenderness: There is no abdominal tenderness. Skin:     Findings: No rash. Neurological:      Mental Status: He is alert and oriented to person, place, and time. Cranial Nerves: No cranial nerve deficit.       Deep Tendon Reflexes: Reflexes normal.   Psychiatric:         Behavior: Behavior normal.         Judgment: Judgment normal.         ASSESSMENT/PLAN:  1. Essential hypertension, benign  wellcontrolled. Discussed signs and symptoms for immediate evaluation in the ER. Reduce sodium. Monitor diet, exercise and lose weight. Keep a BP log and bring it to your next appointment. Needs to rtc in one month for BP check  - amLODIPine (NORVASC) 5 MG tablet; Take 1 tablet by mouth daily  Dispense: 90 tablet; Refill: 3  - triamterene-hydroCHLOROthiazide (DYAZIDE) 37.5-25 MG per capsule; TAKE 1 CAPSULE BY MOUTH  DAILY  Dispense: 90 capsule; Refill: 3  - External Referral To ENT    2. Tonsillar cancer ([facility] [address]  Stable  Continue with medication  make appointments with specialist for examination, very important. Answered questions  - External Referral To ENT  - External Referral To ENT      Return in about 6 months (around 9/2/2021).

## 2021-03-07 ASSESSMENT — ENCOUNTER SYMPTOMS
WHEEZING: 0
COUGH: 0
SHORTNESS OF BREATH: 0
ANAL BLEEDING: 0
FACIAL SWELLING: 0
VOMITING: 0
ABDOMINAL PAIN: 0
VOICE CHANGE: 1
DIARRHEA: 0
EYE DISCHARGE: 0
SORE THROAT: 0
RHINORRHEA: 0
NAUSEA: 0
CHEST TIGHTNESS: 0
SINUS PRESSURE: 0
TROUBLE SWALLOWING: 1

## 2021-03-23 ENCOUNTER — IMMUNIZATION (OUTPATIENT)
Dept: PRIMARY CARE CLINIC | Age: 66
End: 2021-03-23
Payer: COMMERCIAL

## 2021-03-23 PROCEDURE — 91300 COVID-19, PFIZER VACCINE 30MCG/0.3ML DOSE: CPT | Performed by: NURSE PRACTITIONER

## 2021-03-23 PROCEDURE — 0002A COVID-19, PFIZER VACCINE 30MCG/0.3ML DOSE: CPT | Performed by: NURSE PRACTITIONER

## 2021-06-02 ENCOUNTER — OFFICE VISIT (OUTPATIENT)
Dept: DERMATOLOGY | Age: 66
End: 2021-06-02
Payer: COMMERCIAL

## 2021-06-02 VITALS — TEMPERATURE: 97.3 F

## 2021-06-02 DIAGNOSIS — L85.3 XEROSIS CUTIS: ICD-10-CM

## 2021-06-02 DIAGNOSIS — L72.0 EPIDERMOID CYST: ICD-10-CM

## 2021-06-02 DIAGNOSIS — L57.0 AK (ACTINIC KERATOSIS): Primary | ICD-10-CM

## 2021-06-02 PROCEDURE — 99213 OFFICE O/P EST LOW 20 MIN: CPT | Performed by: DERMATOLOGY

## 2021-06-02 PROCEDURE — 17003 DESTRUCT PREMALG LES 2-14: CPT | Performed by: DERMATOLOGY

## 2021-06-02 PROCEDURE — 17000 DESTRUCT PREMALG LESION: CPT | Performed by: DERMATOLOGY

## 2021-06-02 NOTE — PROGRESS NOTES
[facility]  Reji Burgess MD  [phone]      Garrett Hidalgo  1955    72 y.o. male     Date of Visit: 6/2/2021    Chief Complaint: skin lesions    History of Present Illness:    1. He presents today for several persistent scaly lesions on the right ear and face. 2.  He also complains of an enlarging lesion on the left cheek. 3.  He complains of dry skin on the trunk and extremities. It has improved at times with use of AmLactin lotion. SCC of the nasal dorsumtreated with Mohs micrographic surgery in October 2014 by Dr. Gena Griggs. History of SCC in situ on the back status post excision 10/2013. Review of Systems:  Gen: Feels well, good sense of health. Past Medical History, Family History, Surgical History, Medications and Allergies reviewed.     Past Medical History:   Diagnosis Date    Adjustment disorder with depressed mood 3/6/2018    Cancer ([address]     skin : [facility] Dr. Sean Whitaker Elevated liver enzymes 6/30/2014    Essential hypertension, benign     Lung mass 1/6/16    REpeat CT 4/19 improved  follows Dr. 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 Humberto Carlson Tonsillar cancer [facility]) 12/8/2015    [facility] - Dr. Destiny Herman s/p chemo/rad     Past Surgical History:   Procedure Laterality Date    CHOLECYSTECTOMY      FOOT SURGERY      KNEE ARTHROSCOPY Right 1/21/2006    Dr. Swanson Sensor: R knee     KNEE ARTHROSCOPY Left 2008       Allergies   Allergen Reactions    Neosporin [Neomycin-Polymyx-Gramicid]      Outpatient Medications Marked as Taking for the 6/2/21 encounter (Office Visit) with Jarett Zuniga MD   Medication Sig Dispense Refill    amLODIPine (NORVASC) 5 MG tablet Take 1 tablet by mouth daily 90 tablet 3    triamterene-hydroCHLOROthiazide (DYAZIDE) 37.5-25 MG per capsule TAKE 1 CAPSULE BY MOUTH  DAILY 90 capsule 3    triamcinolone (KENALOG) 0.1 % cream Apply to affected area twice daily for up to 2 weeks or until improved. 80 g 1    Glucosamine-Chondroitin (MOVE FREE PO) Take by mouth      Omega-3 Fatty Acids 1200 MG CAPS 1 EVERY DAY      Docusate Sodium 100 MG TABS 2 Every Morning      therapeutic multivitamin-minerals (THERAGRAN-M) tablet Take 1 tablet by mouth daily. Physical Examination       The following were examined and determined to be normal: Psych/Neuro, Scalp/hair, Conjunctivae/eyelids, Gums/teeth/lips, Neck, Breast/axilla/chest, Abdomen, Back and Nails/digits. The following were examined and determined to be abnormal: Head/face, RUE, LUE, RLE and LLE. Well-appearing. 1.  Right mid helix1, forehead4, right lateral cheek2: Several keratotic erythematous macules. 2.  Left lower cheek with a round skin colored nodule with overlying black punctum. 3.  Extremities with diffuse flaky scaling of the skin. Assessment and Plan     1. AK (actinic keratosis) -     2 cycles of liquid nitrogen applied to 7 AKs:  Right mid helix1, forehead4, right lateral cheek2. Patient was educated regarding the potential risks of blister formation and discomfort. Wound care was discussed. 2. Epidermoid cyst of the left cheek - enlarging    Return for excision. 3. Xerosis cutis     AmLactin lotion or Cetaphil cream daily.        --Jamal Lui MD

## 2021-06-08 ENCOUNTER — PATIENT MESSAGE (OUTPATIENT)
Dept: DERMATOLOGY | Age: 66
End: 2021-06-08

## 2021-06-08 RX ORDER — KETOCONAZOLE 20 MG/ML
SHAMPOO TOPICAL
Qty: 1 BOTTLE | Refills: 5 | Status: SHIPPED | OUTPATIENT
Start: 2021-06-08 | End: 2022-09-13 | Stop reason: SDUPTHER

## 2021-06-08 NOTE — TELEPHONE ENCOUNTER
From: Karmen Arredondo  To: Ivy Stevenson MD  Sent: 6/8/2021 8:13 AM EDT  Subject: Prescription Question    Dr. Good Douglas,  I thought you were going to call in a script for shampoo. Did I misunderstand?

## 2021-07-16 ENCOUNTER — PROCEDURE VISIT (OUTPATIENT)
Dept: DERMATOLOGY | Age: 66
End: 2021-07-16
Payer: MEDICARE

## 2021-07-16 VITALS — TEMPERATURE: 97.1 F

## 2021-07-16 DIAGNOSIS — R20.9 DISTURBANCE OF SKIN SENSATION: ICD-10-CM

## 2021-07-16 DIAGNOSIS — L72.0 EPIDERMOID CYST: Primary | ICD-10-CM

## 2021-07-16 PROCEDURE — 11441 EXC FACE-MM B9+MARG 0.6-1 CM: CPT | Performed by: DERMATOLOGY

## 2021-07-16 NOTE — PROGRESS NOTES
Fatty Acids 1200 MG CAPS 1 EVERY DAY      Docusate Sodium 100 MG TABS 2 Every Morning      therapeutic multivitamin-minerals (THERAGRAN-M) tablet Take 1 tablet by mouth daily. Physical Examination       Well appearing. 1.  Left lower cheek with an 8 mm round skin colored nodule with overlying punctum. Assessment and Plan     1. Enlarging tender epidermoid cyst of the left lower cheek    The site to be treated was confirmed with the patient and the previous note. The patient was educated regarding potential risks of bleeding, discomfort, scar, and recurrence. A consent form was signed by the patient. The area was prepped and draped in the normal sterile fashion using chlorhexidine. Local anesthesia was obtained wth 1% lidocaine with epinephrine. An incision was performed overlying the cyst using a 5 mm punch tool, the cyst was visualized and dissected from the surrounding tissue. The specimen was submitted to pathology in an appropriately labeled specimen container. Pinpoint hemostasis was obtained. The wound edges were approximated with simple interrupted sutures of  5-0 . Blood loss was minimal and there were no immediate complications. The wound was covered with petrolatum and a bandage. The patient's wife will remove the sutures in 7 days.            --Cherrie oMntgomery MD

## 2021-07-20 LAB — DERMATOLOGY PATHOLOGY REPORT: NORMAL

## 2021-08-25 ENCOUNTER — OFFICE VISIT (OUTPATIENT)
Dept: FAMILY MEDICINE CLINIC | Age: 66
End: 2021-08-25
Payer: MEDICARE

## 2021-08-25 VITALS
DIASTOLIC BLOOD PRESSURE: 88 MMHG | WEIGHT: 235 LBS | HEART RATE: 82 BPM | OXYGEN SATURATION: 98 % | BODY MASS INDEX: 32.78 KG/M2 | SYSTOLIC BLOOD PRESSURE: 138 MMHG

## 2021-08-25 DIAGNOSIS — Z12.5 SCREENING PSA (PROSTATE SPECIFIC ANTIGEN): ICD-10-CM

## 2021-08-25 DIAGNOSIS — C09.9 TONSILLAR CANCER (HCC): Primary | ICD-10-CM

## 2021-08-25 DIAGNOSIS — I10 ESSENTIAL HYPERTENSION, BENIGN: ICD-10-CM

## 2021-08-25 PROCEDURE — 3017F COLORECTAL CA SCREEN DOC REV: CPT | Performed by: FAMILY MEDICINE

## 2021-08-25 PROCEDURE — 1123F ACP DISCUSS/DSCN MKR DOCD: CPT | Performed by: FAMILY MEDICINE

## 2021-08-25 PROCEDURE — 99214 OFFICE O/P EST MOD 30 MIN: CPT | Performed by: FAMILY MEDICINE

## 2021-08-25 PROCEDURE — G8427 DOCREV CUR MEDS BY ELIG CLIN: HCPCS | Performed by: FAMILY MEDICINE

## 2021-08-25 PROCEDURE — 1036F TOBACCO NON-USER: CPT | Performed by: FAMILY MEDICINE

## 2021-08-25 PROCEDURE — G8417 CALC BMI ABV UP PARAM F/U: HCPCS | Performed by: FAMILY MEDICINE

## 2021-08-25 PROCEDURE — 4040F PNEUMOC VAC/ADMIN/RCVD: CPT | Performed by: FAMILY MEDICINE

## 2021-08-25 RX ORDER — AMLODIPINE BESYLATE 5 MG/1
5 TABLET ORAL DAILY
Qty: 90 TABLET | Refills: 3 | Status: SHIPPED | OUTPATIENT
Start: 2021-08-25 | End: 2022-08-08

## 2021-08-25 RX ORDER — TRIAMTERENE AND HYDROCHLOROTHIAZIDE 37.5; 25 MG/1; MG/1
CAPSULE ORAL
Qty: 90 CAPSULE | Refills: 3 | Status: SHIPPED | OUTPATIENT
Start: 2021-08-25 | End: 2022-08-08

## 2021-08-25 NOTE — PROGRESS NOTES
2021     Avelina Pacheco (:  1955) is a 77 y.o. male, here for evaluation of the following medical concerns:    HPI     Today patient rtc for three month follow up. Overall he is feeling well and doesn't have a new problem. 1.  Hx of tonsilar cancer- started to have the discomfort several months ago, no sore throat, clearing throat, no voice changing, dysphagia, patient saw his Oncologist at [facility] and was told that his evaluation was wnl.      2.  HTN- sebastian today, taking his medications as prescribed but has been out of this for the past several days, not needing labs today, denied headache, vision change, or dizziness.      3. Repeat colonoscopy in 10 years.      Today, denied chest pain, sob, n, v,or diarrhea.   Review of Systems   Constitutional: Negative for activity change, fatigue, fever and unexpected weight change. HENT: Negative for congestion, ear pain, facial swelling, hearing loss, rhinorrhea, sinus pressure and trouble swallowing. Respiratory: Negative for cough and shortness of breath. Cardiovascular: Negative for chest pain, palpitations and leg swelling. Gastrointestinal: Negative for abdominal pain, diarrhea, nausea and vomiting. Endocrine: Negative for cold intolerance, heat intolerance, polydipsia and polyphagia. Musculoskeletal: Negative for arthralgias. Skin: Negative for rash. Neurological: Negative for dizziness, syncope, light-headedness and headaches. Psychiatric/Behavioral: Negative for dysphoric mood. The patient is not nervous/anxious. Prior to Visit Medications    Medication Sig Taking? Authorizing Provider   triamterene-hydroCHLOROthiazide (DYAZIDE) 37.5-25 MG per capsule TAKE 1 CAPSULE BY MOUTH  DAILY Yes Jim Muniz, DO   amLODIPine (NORVASC) 5 MG tablet Take 1 tablet by mouth daily Yes Jim Muniz, DO   ketoconazole (NIZORAL) 2 % shampoo Use to wash the scalp 3 times weekly. Leave on the scalp for 5 minutes prior to rinsing.  Yes Sherman Alejandro MD   Glucosamine-Chondroitin (MOVE FREE PO) Take by mouth Yes Historical Provider, MD   Omega-3 Fatty Acids 1200 MG CAPS 1 EVERY DAY Yes Historical Provider, MD   Docusate Sodium 100 MG TABS 2 Every Morning Yes Historical Provider, MD   therapeutic multivitamin-minerals (THERAGRAN-M) tablet Take 1 tablet by mouth daily. Yes Historical Provider, MD   triamcinolone (KENALOG) 0.1 % cream Apply to affected area twice daily for up to 2 weeks or until improved. Patient not taking: Reported on 2021  Sherman Alejandro MD        Social History     Tobacco Use    Smoking status: Former Smoker     Packs/day: 1.00     Years: 28.00     Pack years: 28.00     Types: Cigarettes     Start date: 1967     Quit date: 8/15/1983     Years since quittin.0    Smokeless tobacco: Never Used   Substance Use Topics    Alcohol use: Yes     Alcohol/week: 0.0 standard drinks     Comment: once a week        Vitals:    21 0857   BP: 138/88   Pulse: 82   SpO2: 98%   Weight: 235 lb (106.6 kg)     Estimated body mass index is 32.78 kg/m² as calculated from the following:    Height as of 3/2/21: 5' 11\" (1.803 m). Weight as of this encounter: 235 lb (106.6 kg). Physical Exam  Vitals and nursing note reviewed. Constitutional:       Appearance: He is well-developed. HENT:      Head: Normocephalic and atraumatic. Right Ear: External ear normal.      Left Ear: External ear normal.   Eyes:      Pupils: Pupils are equal, round, and reactive to light. Neck:      Thyroid: No thyromegaly. Cardiovascular:      Rate and Rhythm: Normal rate and regular rhythm. Heart sounds: No murmur heard. Pulmonary:      Effort: Pulmonary effort is normal.      Breath sounds: Normal breath sounds. No wheezing or rales. Abdominal:      General: Bowel sounds are normal.      Palpations: Abdomen is soft. Tenderness: There is no abdominal tenderness. Musculoskeletal:         General: Normal range of motion.

## 2021-08-27 ENCOUNTER — NURSE ONLY (OUTPATIENT)
Dept: FAMILY MEDICINE CLINIC | Age: 66
End: 2021-08-27
Payer: MEDICARE

## 2021-08-27 DIAGNOSIS — C09.9 TONSILLAR CANCER (HCC): ICD-10-CM

## 2021-08-27 DIAGNOSIS — Z12.5 SCREENING PSA (PROSTATE SPECIFIC ANTIGEN): ICD-10-CM

## 2021-08-27 DIAGNOSIS — I10 ESSENTIAL HYPERTENSION, BENIGN: ICD-10-CM

## 2021-08-27 LAB
A/G RATIO: 2 (ref 1.1–2.2)
ALBUMIN SERPL-MCNC: 4.4 G/DL (ref 3.4–5)
ALP BLD-CCNC: 57 U/L (ref 40–129)
ALT SERPL-CCNC: 16 U/L (ref 10–40)
ANION GAP SERPL CALCULATED.3IONS-SCNC: 10 MMOL/L (ref 3–16)
AST SERPL-CCNC: 18 U/L (ref 15–37)
BILIRUB SERPL-MCNC: 0.7 MG/DL (ref 0–1)
BUN BLDV-MCNC: 17 MG/DL (ref 7–20)
CALCIUM SERPL-MCNC: 10.2 MG/DL (ref 8.3–10.6)
CHLORIDE BLD-SCNC: 102 MMOL/L (ref 99–110)
CHOLESTEROL, TOTAL: 143 MG/DL (ref 0–199)
CO2: 27 MMOL/L (ref 21–32)
CREAT SERPL-MCNC: 0.8 MG/DL (ref 0.8–1.3)
GFR AFRICAN AMERICAN: >60
GFR NON-AFRICAN AMERICAN: >60
GLOBULIN: 2.2 G/DL
GLUCOSE BLD-MCNC: 122 MG/DL (ref 70–99)
HCT VFR BLD CALC: 42.1 % (ref 40.5–52.5)
HDLC SERPL-MCNC: 52 MG/DL (ref 40–60)
HEMOGLOBIN: 14.7 G/DL (ref 13.5–17.5)
LDL CHOLESTEROL CALCULATED: 78 MG/DL
MCH RBC QN AUTO: 31 PG (ref 26–34)
MCHC RBC AUTO-ENTMCNC: 34.9 G/DL (ref 31–36)
MCV RBC AUTO: 88.7 FL (ref 80–100)
PDW BLD-RTO: 13.6 % (ref 12.4–15.4)
PLATELET # BLD: 164 K/UL (ref 135–450)
PMV BLD AUTO: 10.4 FL (ref 5–10.5)
POTASSIUM SERPL-SCNC: 4.3 MMOL/L (ref 3.5–5.1)
PROSTATE SPECIFIC ANTIGEN: 0.57 NG/ML (ref 0–4)
RBC # BLD: 4.75 M/UL (ref 4.2–5.9)
SODIUM BLD-SCNC: 139 MMOL/L (ref 136–145)
TOTAL PROTEIN: 6.6 G/DL (ref 6.4–8.2)
TRIGL SERPL-MCNC: 63 MG/DL (ref 0–150)
VLDLC SERPL CALC-MCNC: 13 MG/DL
WBC # BLD: 5.1 K/UL (ref 4–11)

## 2021-08-27 PROCEDURE — 36415 COLL VENOUS BLD VENIPUNCTURE: CPT | Performed by: FAMILY MEDICINE

## 2021-08-31 ASSESSMENT — ENCOUNTER SYMPTOMS
FACIAL SWELLING: 0
RHINORRHEA: 0
COUGH: 0
ABDOMINAL PAIN: 0
NAUSEA: 0
VOMITING: 0
SHORTNESS OF BREATH: 0
DIARRHEA: 0
SINUS PRESSURE: 0
TROUBLE SWALLOWING: 0

## 2021-10-14 ENCOUNTER — NURSE ONLY (OUTPATIENT)
Dept: FAMILY MEDICINE CLINIC | Age: 66
End: 2021-10-14
Payer: MEDICARE

## 2021-10-14 PROCEDURE — 90694 VACC AIIV4 NO PRSRV 0.5ML IM: CPT | Performed by: FAMILY MEDICINE

## 2021-10-14 PROCEDURE — G0008 ADMIN INFLUENZA VIRUS VAC: HCPCS | Performed by: FAMILY MEDICINE

## 2021-10-14 NOTE — PROGRESS NOTES
Vaccine Information Sheet, \"Influenza - Inactivated\"  given to Nella Pace, or parent/legal guardian of  Nella Pace and verbalized understanding. Patient responses:    Have you ever had a reaction to a flu vaccine? No  Do you have any current illness? No  Have you ever had [place] Syndrome? No  Do you have a serious allergy to any of the follow: Neomycin, Polymyxin, Thimerosal, eggs or egg products? No    Flu vaccine given per order. Please see immunization tab. Risks and benefits explained. Current VIS given.       Immunizations Administered     Name Date Dose Route    Influenza, Quadv, adjuvanted, 65 yrs +, IM, PF (Fluad) 10/14/2021 0.5 mL Intramuscular    Site: Deltoid- Left    Lot: [id number]    NDC: 41828-384-20

## 2021-11-17 ENCOUNTER — OFFICE VISIT (OUTPATIENT)
Dept: DERMATOLOGY | Age: 66
End: 2021-11-17
Payer: MEDICARE

## 2021-11-17 VITALS — TEMPERATURE: 97.2 F

## 2021-11-17 DIAGNOSIS — L57.0 ACTINIC KERATOSIS: ICD-10-CM

## 2021-11-17 DIAGNOSIS — L82.0 INFLAMED SEBORRHEIC KERATOSIS: Primary | ICD-10-CM

## 2021-11-17 PROCEDURE — 17110 DESTRUCTION B9 LES UP TO 14: CPT | Performed by: DERMATOLOGY

## 2021-11-17 PROCEDURE — 17000 DESTRUCT PREMALG LESION: CPT | Performed by: DERMATOLOGY

## 2021-11-17 PROCEDURE — 17003 DESTRUCT PREMALG LES 2-14: CPT | Performed by: DERMATOLOGY

## 2021-11-17 NOTE — PROGRESS NOTES
[facility]  Maxine Oh MD  [phone]      Jose E Antony  1955    77 y.o. male     Date of Visit: 11/17/2021    Chief Complaint: skin lesions    History of Present Illness:    1. He complains of a couple of changing lesions on the right flexural forearm and left elbow. None are painful. 2.  He also has few persistent scaly lesions on the forehead. 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 Franklin Pond. History of SCC in situ on the back status post excision 10/2013. Review of Systems:  Gen: Feels well, good sense of health. Skin: No new or changing moles. Past Medical History, Family History, Surgical History, Medications and Allergies reviewed.     Past Medical History:   Diagnosis Date    Adjustment disorder with depressed mood 3/6/2018    Cancer ([facility] [address]     skin : [facility] Dr. Saray Bhatti Elevated liver enzymes 6/30/2014    Essential hypertension, benign     Lung mass 1/6/16    REpeat CT 4/19 improved  follows Dr. 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 Livan Damian Tonsillar cancer [facility]) 12/8/2015    [facility] - Dr. Sue Oh s/p chemo/rad     Past Surgical History:   Procedure Laterality Date    CHOLECYSTECTOMY      FOOT SURGERY      KNEE ARTHROSCOPY Right 1/21/2006    Dr. Yessica Mims: R knee     KNEE ARTHROSCOPY Left 2008       Allergies   Allergen Reactions    Neosporin [Neomycin-Polymyx-Gramicid]      Outpatient Medications Marked as Taking for the 11/17/21 encounter (Office Visit) with Urszula Garg MD   Medication Sig Dispense Refill    triamterene-hydroCHLOROthiazide (DYAZIDE) 37.5-25 MG per capsule TAKE 1 CAPSULE BY MOUTH  DAILY 90 capsule 3    amLODIPine (NORVASC) 5 MG tablet Take 1 tablet by mouth daily 90 tablet 3    ketoconazole (NIZORAL) 2 % shampoo Use to wash the scalp 3 times weekly. Leave on the scalp for 5 minutes prior to rinsing. 1 Bottle 5    triamcinolone (KENALOG) 0.1 % cream Apply to affected area twice daily for up to 2 weeks or until improved. 80 g 1    Glucosamine-Chondroitin (MOVE FREE PO) Take by mouth      Omega-3 Fatty Acids 1200 MG CAPS 1 EVERY DAY      Docusate Sodium 100 MG TABS 2 Every Morning      therapeutic multivitamin-minerals (THERAGRAN-M) tablet Take 1 tablet by mouth daily. Physical Examination       Well-appearing. 1.  Right mid flexural forearm with a stuck on appearing verrucous pink papule. Left lateral elbow with a stuck on appearing verrucous pink papule. 2.  Forehead with 5 keratotic erythematous macules. Assessment and Plan     1. Inflamed seborrheic keratosis     2 cycles of liquid nitrogen applied to 2 ISKs: right flexural forearm and left lateral elbow. Patient was educated regarding the potential risks of blister formation and discomfort. Wound care was discussed. 2. Actinic keratosis - several    2 cycles of liquid nitrogen applied to 5 AKs on the forehead. Patient was educated regarding the potential risks of blister formation and discomfort. Wound care was discussed.           --Mazin Davey MD

## 2022-02-25 ENCOUNTER — OFFICE VISIT (OUTPATIENT)
Dept: FAMILY MEDICINE CLINIC | Age: 67
End: 2022-02-25
Payer: MEDICARE

## 2022-02-25 VITALS
DIASTOLIC BLOOD PRESSURE: 80 MMHG | WEIGHT: 234 LBS | BODY MASS INDEX: 32.76 KG/M2 | SYSTOLIC BLOOD PRESSURE: 128 MMHG | HEART RATE: 54 BPM | HEIGHT: 71 IN | OXYGEN SATURATION: 98 %

## 2022-02-25 DIAGNOSIS — I10 ESSENTIAL HYPERTENSION, BENIGN: Primary | ICD-10-CM

## 2022-02-25 DIAGNOSIS — C09.9 TONSILLAR CANCER (HCC): ICD-10-CM

## 2022-02-25 PROCEDURE — 4040F PNEUMOC VAC/ADMIN/RCVD: CPT | Performed by: FAMILY MEDICINE

## 2022-02-25 PROCEDURE — 99213 OFFICE O/P EST LOW 20 MIN: CPT | Performed by: FAMILY MEDICINE

## 2022-02-25 PROCEDURE — 1123F ACP DISCUSS/DSCN MKR DOCD: CPT | Performed by: FAMILY MEDICINE

## 2022-02-25 PROCEDURE — 3017F COLORECTAL CA SCREEN DOC REV: CPT | Performed by: FAMILY MEDICINE

## 2022-02-25 PROCEDURE — G8427 DOCREV CUR MEDS BY ELIG CLIN: HCPCS | Performed by: FAMILY MEDICINE

## 2022-02-25 PROCEDURE — G8484 FLU IMMUNIZE NO ADMIN: HCPCS | Performed by: FAMILY MEDICINE

## 2022-02-25 PROCEDURE — G8417 CALC BMI ABV UP PARAM F/U: HCPCS | Performed by: FAMILY MEDICINE

## 2022-02-25 PROCEDURE — 1036F TOBACCO NON-USER: CPT | Performed by: FAMILY MEDICINE

## 2022-02-25 SDOH — ECONOMIC STABILITY: FOOD INSECURITY: WITHIN THE PAST 12 MONTHS, THE FOOD YOU BOUGHT JUST DIDN'T LAST AND YOU DIDN'T HAVE MONEY TO GET MORE.: NEVER TRUE

## 2022-02-25 SDOH — ECONOMIC STABILITY: FOOD INSECURITY: WITHIN THE PAST 12 MONTHS, YOU WORRIED THAT YOUR FOOD WOULD RUN OUT BEFORE YOU GOT MONEY TO BUY MORE.: NEVER TRUE

## 2022-02-25 ASSESSMENT — PATIENT HEALTH QUESTIONNAIRE - PHQ9
2. FEELING DOWN, DEPRESSED OR HOPELESS: 0
SUM OF ALL RESPONSES TO PHQ9 QUESTIONS 1 & 2: 0
SUM OF ALL RESPONSES TO PHQ QUESTIONS 1-9: 0
1. LITTLE INTEREST OR PLEASURE IN DOING THINGS: 0

## 2022-02-25 ASSESSMENT — SOCIAL DETERMINANTS OF HEALTH (SDOH): HOW HARD IS IT FOR YOU TO PAY FOR THE VERY BASICS LIKE FOOD, HOUSING, MEDICAL CARE, AND HEATING?: NOT HARD AT ALL

## 2022-02-25 NOTE — PROGRESS NOTES
2022     Giulia Joiner (:  1955) is a 77 y.o. male, here for evaluation of the following medical concerns:    HPI     Patient presented for his 6 month follow up    1.  Hx of tonsilar cancer- started to have the discomfort several months ago, no sore throat, clearing throat, no voice changing, dysphagia, patient saw his Oncologist at [facility] and was told that his evaluation was wnl.      2.  HTN- sebastian today, taking his medications as prescribed but has been out of this for the past several days, not needing labs today, denied headache, vision change, or dizziness.      3.  Repeat colonoscopy in 10 years.      Today, denied chest pain, sob, n, v,or diarrhea.   Review of Systems   Constitutional: Negative for activity change, fatigue, fever and unexpected weight change. HENT: Negative for congestion, ear pain, facial swelling, hearing loss, rhinorrhea, sinus pressure, sore throat and trouble swallowing. Eyes: Negative for discharge and visual disturbance. Respiratory: Negative for cough, chest tightness, shortness of breath and wheezing. Cardiovascular: Negative for chest pain, palpitations and leg swelling. Gastrointestinal: Negative for abdominal pain, anal bleeding, blood in stool, diarrhea, nausea and vomiting. Endocrine: Negative for cold intolerance, heat intolerance, polydipsia and polyphagia. Musculoskeletal: Negative for arthralgias. Skin: Negative for rash. Neurological: Negative for dizziness, light-headedness and headaches. Psychiatric/Behavioral: Negative for dysphoric mood. The patient is not nervous/anxious. Prior to Visit Medications    Medication Sig Taking? Authorizing Provider   triamterene-hydroCHLOROthiazide (DYAZIDE) 37.5-25 MG per capsule TAKE 1 CAPSULE BY MOUTH  DAILY Yes Jim Muniz, DO   amLODIPine (NORVASC) 5 MG tablet Take 1 tablet by mouth daily Yes Jim Muniz, DO   ketoconazole (NIZORAL) 2 % shampoo Use to wash the scalp 3 times weekly. Tenderness: There is no abdominal tenderness. Musculoskeletal:         General: Normal range of motion. Cervical back: Neck supple. Lymphadenopathy:      Cervical: No cervical adenopathy. Skin:     Findings: No rash. Neurological:      Mental Status: He is alert and oriented to person, place, and time. Cranial Nerves: No cranial nerve deficit. Deep Tendon Reflexes: Reflexes normal.   Psychiatric:         Behavior: Behavior normal.         Judgment: Judgment normal.         ASSESSMENT/PLAN:  1. Essential hypertension, benign  controlled. Discussed signs and symptoms for immediate evaluation in the ER. Reduce sodium. Monitor diet, exercise and lose weight. Keep a BP log and bring it to your next appointment. 2. Tonsillar cancer ([facility] [address]  Stable  Continue with medication  Keep appointments with specialist.   Eleazar Jj questions      Return in about 6 months (around 8/25/2022) for AWV in office for 30 minutes.

## 2022-02-26 PROBLEM — E66.09 NON MORBID OBESITY DUE TO EXCESS CALORIES: Status: RESOLVED | Noted: 2017-08-28 | Resolved: 2022-02-26

## 2022-02-26 ASSESSMENT — ENCOUNTER SYMPTOMS
WHEEZING: 0
BLOOD IN STOOL: 0
SHORTNESS OF BREATH: 0
NAUSEA: 0
VOMITING: 0
EYE DISCHARGE: 0
DIARRHEA: 0
SINUS PRESSURE: 0
FACIAL SWELLING: 0
COUGH: 0
CHEST TIGHTNESS: 0
TROUBLE SWALLOWING: 0
SORE THROAT: 0
RHINORRHEA: 0
ANAL BLEEDING: 0
ABDOMINAL PAIN: 0

## 2022-08-07 DIAGNOSIS — I10 ESSENTIAL HYPERTENSION, BENIGN: ICD-10-CM

## 2022-08-08 RX ORDER — AMLODIPINE BESYLATE 5 MG/1
TABLET ORAL
Qty: 90 TABLET | Refills: 3 | Status: SHIPPED | OUTPATIENT
Start: 2022-08-08

## 2022-08-08 RX ORDER — TRIAMTERENE AND HYDROCHLOROTHIAZIDE 37.5; 25 MG/1; MG/1
CAPSULE ORAL
Qty: 90 CAPSULE | Refills: 3 | Status: SHIPPED | OUTPATIENT
Start: 2022-08-08

## 2022-09-13 ENCOUNTER — OFFICE VISIT (OUTPATIENT)
Dept: DERMATOLOGY | Age: 67
End: 2022-09-13
Payer: MEDICARE

## 2022-09-13 DIAGNOSIS — L57.0 ACTINIC KERATOSIS: Primary | ICD-10-CM

## 2022-09-13 DIAGNOSIS — I78.8 CAPILLARITIS: ICD-10-CM

## 2022-09-13 DIAGNOSIS — L21.9 SEBORRHEIC DERMATITIS: ICD-10-CM

## 2022-09-13 PROCEDURE — 3017F COLORECTAL CA SCREEN DOC REV: CPT | Performed by: DERMATOLOGY

## 2022-09-13 PROCEDURE — G8427 DOCREV CUR MEDS BY ELIG CLIN: HCPCS | Performed by: DERMATOLOGY

## 2022-09-13 PROCEDURE — G8417 CALC BMI ABV UP PARAM F/U: HCPCS | Performed by: DERMATOLOGY

## 2022-09-13 PROCEDURE — 17003 DESTRUCT PREMALG LES 2-14: CPT | Performed by: DERMATOLOGY

## 2022-09-13 PROCEDURE — 99213 OFFICE O/P EST LOW 20 MIN: CPT | Performed by: DERMATOLOGY

## 2022-09-13 PROCEDURE — 17000 DESTRUCT PREMALG LESION: CPT | Performed by: DERMATOLOGY

## 2022-09-13 PROCEDURE — 1036F TOBACCO NON-USER: CPT | Performed by: DERMATOLOGY

## 2022-09-13 PROCEDURE — 1123F ACP DISCUSS/DSCN MKR DOCD: CPT | Performed by: DERMATOLOGY

## 2022-09-13 RX ORDER — KETOCONAZOLE 20 MG/ML
SHAMPOO TOPICAL
Qty: 1 EACH | Refills: 5 | Status: SHIPPED | OUTPATIENT
Start: 2022-09-13

## 2022-09-13 RX ORDER — TRIAMCINOLONE ACETONIDE 1 MG/G
CREAM TOPICAL
Qty: 80 G | Refills: 1 | Status: SHIPPED | OUTPATIENT
Start: 2022-09-13

## 2022-09-13 NOTE — PROGRESS NOTES
[facility] Dermatology  Justus Ayon MD  [phone]      Beverley Zuniga  1955    79 y.o. male     Date of Visit: 9/13/2022    Chief Complaint: skin lesions    History of Present Illness:    1. Follow up for a history of AKs of the forehead - treated with cryotherapy at last visit. Has few new lesions on the scalp and forehead. 2.  He complains of a recurrent eruption on the legs. Typically shows up after golfing 18 holes (walks the course). Has had improvement with triamcinolone 0.1% cream.     3.  He has a history of seborrheic dermatitis of the scalp - reports good control with use of ketoconazole 2% shampoo 2 times weekly. 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 Dominga Singh. History of SCC in situ on the back status post excision 10/2013. Review of Systems:  Gen: Feels well, good sense of health. Past Medical History, Family History, Surgical History, Medications and Allergies reviewed.     Past Medical History:   Diagnosis Date    Adjustment disorder with depressed mood 3/6/2018    Cancer ([facility] [place] [address]     skin : [facility] Dr. Alexis Crow    Elevated liver enzymes 6/30/2014    Essential hypertension, benign     Lung mass 1/6/16    REpeat CT 4/19 improved  follows Dr. Martin Sainz at 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 Michelle Roe cancer [facility]) 12/8/2015    [facility] - Dr. Corey Mayes s/p chemo/rad     Past Surgical History:   Procedure Laterality Date    CHOLECYSTECTOMY      FOOT SURGERY      KNEE ARTHROSCOPY Right 1/21/2006    Dr. Tate Scott: R knee     KNEE ARTHROSCOPY Left 2008       Allergies   Allergen Reactions    Neosporin [Neomycin-Polymyx-Gramicid]      Outpatient Medications Marked as Taking for the 9/13/22 encounter (Office Visit) with Jeovany Chandler MD   Medication Sig Dispense Refill    ketoconazole (NIZORAL) 2 % shampoo Use to wash the scalp 3 times weekly. Leave on the scalp for 5 minutes prior to rinsing. 1 each 5    triamcinolone (KENALOG) 0.1 % cream Apply to affected area twice daily for up to 2 weeks or until improved. 80 g 1    amLODIPine (NORVASC) 5 MG tablet TAKE 1 TABLET DAILY 90 tablet 3    triamterene-hydroCHLOROthiazide (DYAZIDE) 37.5-25 MG per capsule TAKE 1 CAPSULE DAILY 90 capsule 3    Glucosamine-Chondroitin (MOVE FREE PO) Take by mouth      Omega-3 Fatty Acids 1200 MG CAPS 1 EVERY DAY      Docusate Sodium 100 MG TABS 2 Every Morning      therapeutic multivitamin-minerals (THERAGRAN-M) tablet Take 1 tablet by mouth daily. Physical Examination       The following were examined and determined to be normal: Psych/Neuro, Conjunctivae/eyelids, Gums/teeth/lips, Neck, Breast/axilla/chest, Abdomen, Back, RUE, LUE, RLE, LLE, and Nails/digits. The following were examined and determined to be abnormal: Scalp/hair and Head/face. Well appearing. 1.  Right lateral forehead - 1, crown of the scalp - 2, left forehead - 1: ill defined keratotic pink macules. 2.  Legs with smooth brown patches with bright red petechial macules. 3.  Clear. Assessment and Plan     1. Actinic keratoses - 4    Cryotherapy was discussed and patient agreed to proceed. Consent was obtained. 4 lesions were treated cryotherapy: Right lateral forehead - 1, crown of the scalp - 2, left forehead - 1. 2 cycles of liquid nitrogen applied to each lesion for 5 seconds using a [employer] cryo spray gun. Patient was educated regarding the potential risks of blister formation and discomfort. Wound care was discussed. The patient tolerated the procedure well and there were no immediate complications.       2. Capillaritis -     Triamcinolone 0.1% cream twice daily for up to 2 weeks or until improved. Discussed compression stockings. 3. Seborrheic dermatitis - under good control    Continue use of Nizoral shampoo 2 times weekly. Return in about 1 year (around 9/13/2023).     --Javier Perales MD

## 2022-10-05 ENCOUNTER — OFFICE VISIT (OUTPATIENT)
Dept: FAMILY MEDICINE CLINIC | Age: 67
End: 2022-10-05
Payer: MEDICARE

## 2022-10-05 VITALS
SYSTOLIC BLOOD PRESSURE: 132 MMHG | BODY MASS INDEX: 33.6 KG/M2 | WEIGHT: 240 LBS | DIASTOLIC BLOOD PRESSURE: 84 MMHG | HEIGHT: 71 IN

## 2022-10-05 DIAGNOSIS — Z12.5 SCREENING PSA (PROSTATE SPECIFIC ANTIGEN): ICD-10-CM

## 2022-10-05 DIAGNOSIS — Z23 FLU VACCINE NEED: ICD-10-CM

## 2022-10-05 DIAGNOSIS — C09.9 TONSILLAR CANCER (HCC): ICD-10-CM

## 2022-10-05 DIAGNOSIS — I10 ESSENTIAL HYPERTENSION, BENIGN: Primary | ICD-10-CM

## 2022-10-05 LAB
A/G RATIO: 2.3 (ref 1.1–2.2)
ALBUMIN SERPL-MCNC: 4.8 G/DL (ref 3.4–5)
ALP BLD-CCNC: 67 U/L (ref 40–129)
ALT SERPL-CCNC: 20 U/L (ref 10–40)
ANION GAP SERPL CALCULATED.3IONS-SCNC: 16 MMOL/L (ref 3–16)
AST SERPL-CCNC: 22 U/L (ref 15–37)
BASOPHILS ABSOLUTE: 0 K/UL (ref 0–0.2)
BASOPHILS RELATIVE PERCENT: 0.4 %
BILIRUB SERPL-MCNC: 0.7 MG/DL (ref 0–1)
BUN BLDV-MCNC: 21 MG/DL (ref 7–20)
CALCIUM SERPL-MCNC: 9.8 MG/DL (ref 8.3–10.6)
CHLORIDE BLD-SCNC: 103 MMOL/L (ref 99–110)
CHOLESTEROL, TOTAL: 168 MG/DL (ref 0–199)
CO2: 21 MMOL/L (ref 21–32)
CREAT SERPL-MCNC: 0.8 MG/DL (ref 0.8–1.3)
EOSINOPHILS ABSOLUTE: 0.1 K/UL (ref 0–0.6)
EOSINOPHILS RELATIVE PERCENT: 2.2 %
GFR AFRICAN AMERICAN: >60
GFR NON-AFRICAN AMERICAN: >60
GLUCOSE BLD-MCNC: 125 MG/DL (ref 70–99)
HCT VFR BLD CALC: 46 % (ref 40.5–52.5)
HDLC SERPL-MCNC: 55 MG/DL (ref 40–60)
HEMOGLOBIN: 15.7 G/DL (ref 13.5–17.5)
LDL CHOLESTEROL CALCULATED: 98 MG/DL
LYMPHOCYTES ABSOLUTE: 1 K/UL (ref 1–5.1)
LYMPHOCYTES RELATIVE PERCENT: 21.1 %
MCH RBC QN AUTO: 30.4 PG (ref 26–34)
MCHC RBC AUTO-ENTMCNC: 34.1 G/DL (ref 31–36)
MCV RBC AUTO: 89.1 FL (ref 80–100)
MONOCYTES ABSOLUTE: 0.5 K/UL (ref 0–1.3)
MONOCYTES RELATIVE PERCENT: 11.3 %
NEUTROPHILS ABSOLUTE: 3.1 K/UL (ref 1.7–7.7)
NEUTROPHILS RELATIVE PERCENT: 65 %
PDW BLD-RTO: 13.3 % (ref 12.4–15.4)
PLATELET # BLD: 169 K/UL (ref 135–450)
PMV BLD AUTO: 9.4 FL (ref 5–10.5)
POTASSIUM SERPL-SCNC: 4.2 MMOL/L (ref 3.5–5.1)
PROSTATE SPECIFIC ANTIGEN: 0.61 NG/ML (ref 0–4)
RBC # BLD: 5.16 M/UL (ref 4.2–5.9)
SODIUM BLD-SCNC: 140 MMOL/L (ref 136–145)
TOTAL PROTEIN: 6.9 G/DL (ref 6.4–8.2)
TRIGL SERPL-MCNC: 76 MG/DL (ref 0–150)
VLDLC SERPL CALC-MCNC: 15 MG/DL
WBC # BLD: 4.8 K/UL (ref 4–11)

## 2022-10-05 PROCEDURE — 99214 OFFICE O/P EST MOD 30 MIN: CPT | Performed by: FAMILY MEDICINE

## 2022-10-05 PROCEDURE — G0008 ADMIN INFLUENZA VIRUS VAC: HCPCS | Performed by: FAMILY MEDICINE

## 2022-10-05 PROCEDURE — G8484 FLU IMMUNIZE NO ADMIN: HCPCS | Performed by: FAMILY MEDICINE

## 2022-10-05 PROCEDURE — G8417 CALC BMI ABV UP PARAM F/U: HCPCS | Performed by: FAMILY MEDICINE

## 2022-10-05 PROCEDURE — 1123F ACP DISCUSS/DSCN MKR DOCD: CPT | Performed by: FAMILY MEDICINE

## 2022-10-05 PROCEDURE — 36415 COLL VENOUS BLD VENIPUNCTURE: CPT | Performed by: FAMILY MEDICINE

## 2022-10-05 PROCEDURE — 3017F COLORECTAL CA SCREEN DOC REV: CPT | Performed by: FAMILY MEDICINE

## 2022-10-05 PROCEDURE — 90694 VACC AIIV4 NO PRSRV 0.5ML IM: CPT | Performed by: FAMILY MEDICINE

## 2022-10-05 PROCEDURE — G8427 DOCREV CUR MEDS BY ELIG CLIN: HCPCS | Performed by: FAMILY MEDICINE

## 2022-10-05 PROCEDURE — 1036F TOBACCO NON-USER: CPT | Performed by: FAMILY MEDICINE

## 2022-10-05 NOTE — PROGRESS NOTES
TAKE 1 TABLET DAILY Yes Jim Muniz,    triamterene-hydroCHLOROthiazide (DYAZIDE) 37.5-25 MG per capsule TAKE 1 CAPSULE DAILY Yes Jim Muniz,    Glucosamine-Chondroitin (MOVE FREE PO) Take by mouth Yes Historical Provider, MD   Omega-3 Fatty Acids 1200 MG CAPS 1 EVERY DAY Yes Historical Provider, MD   Docusate Sodium 100 MG TABS 2 Every Morning Yes Historical Provider, MD   therapeutic multivitamin-minerals (THERAGRAN-M) tablet Take 1 tablet by mouth daily. Yes Historical Provider, MD        Social History     Tobacco Use    Smoking status: Former     Packs/day: 1.00     Years: 28.00     Pack years: 28.00     Types: Cigarettes     Start date: 1967     Quit date: 8/15/1983     Years since quittin.1    Smokeless tobacco: Never   Substance Use Topics    Alcohol use: Yes     Alcohol/week: 0.0 standard drinks     Comment: once a week        Vitals:    10/05/22 0841 10/05/22 0901   BP: (!) 130/92 132/84   Weight: 240 lb (108.9 kg)    Height: 5' 11\" (1.803 m)      Estimated body mass index is 33.47 kg/m² as calculated from the following:    Height as of this encounter: 5' 11\" (1.803 m). Weight as of this encounter: 240 lb (108.9 kg). Physical Exam  Vitals and nursing note reviewed. Constitutional:       Appearance: He is well-developed. HENT:      Head: Normocephalic and atraumatic. Right Ear: Tympanic membrane, ear canal and external ear normal.      Left Ear: Tympanic membrane, ear canal and external ear normal.   Neck:      Thyroid: No thyromegaly. Cardiovascular:      Rate and Rhythm: Normal rate and regular rhythm. Heart sounds: No murmur heard. Pulmonary:      Effort: Pulmonary effort is normal.      Breath sounds: Normal breath sounds. No wheezing or rales. Abdominal:      General: Bowel sounds are normal.      Palpations: Abdomen is soft. Tenderness: There is no abdominal tenderness. Musculoskeletal:         General: Normal range of motion.    Skin:     Findings: No rash. Neurological:      Mental Status: He is alert and oriented to person, place, and time. Psychiatric:         Behavior: Behavior normal.       ASSESSMENT/PLAN:  1. Essential hypertension, benign   controlled. Discussed signs and symptoms for immediate evaluation in the ER. Reduce sodium. Monitor diet, exercise and lose weight. Keep a BP log and bring it to your next appointment. Declined AAA screening.       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   Rhea Holliday questions   - Comprehensive Metabolic Panel  - CBC with Auto Differential  - Lipid Panel  - PSA Screening    3. Screening PSA (prostate specific antigen)  Stable  Continue with medication  Keep appointments with specialist.   Answered questions   - Comprehensive Metabolic Panel  - CBC with Auto Differential  - Lipid Panel  - PSA Screening    4. Flu vaccine need  Lab obtained  Educated on the importance of having this done. Answered questions     No follow-ups on file.

## 2022-10-09 ASSESSMENT — ENCOUNTER SYMPTOMS
NAUSEA: 0
DIARRHEA: 0
TROUBLE SWALLOWING: 0
SORE THROAT: 0
VOMITING: 0
SHORTNESS OF BREATH: 0
VOICE CHANGE: 0
SINUS PRESSURE: 0
RHINORRHEA: 0
COUGH: 0
ABDOMINAL PAIN: 0

## 2023-04-03 SDOH — ECONOMIC STABILITY: TRANSPORTATION INSECURITY
IN THE PAST 12 MONTHS, HAS LACK OF TRANSPORTATION KEPT YOU FROM MEETINGS, WORK, OR FROM GETTING THINGS NEEDED FOR DAILY LIVING?: NO

## 2023-04-03 SDOH — ECONOMIC STABILITY: FOOD INSECURITY: WITHIN THE PAST 12 MONTHS, YOU WORRIED THAT YOUR FOOD WOULD RUN OUT BEFORE YOU GOT MONEY TO BUY MORE.: NEVER TRUE

## 2023-04-03 SDOH — ECONOMIC STABILITY: FOOD INSECURITY: WITHIN THE PAST 12 MONTHS, THE FOOD YOU BOUGHT JUST DIDN'T LAST AND YOU DIDN'T HAVE MONEY TO GET MORE.: NEVER TRUE

## 2023-04-03 SDOH — ECONOMIC STABILITY: INCOME INSECURITY: HOW HARD IS IT FOR YOU TO PAY FOR THE VERY BASICS LIKE FOOD, HOUSING, MEDICAL CARE, AND HEATING?: NOT HARD AT ALL

## 2023-04-03 SDOH — ECONOMIC STABILITY: HOUSING INSECURITY
IN THE LAST 12 MONTHS, WAS THERE A TIME WHEN YOU DID NOT HAVE A STEADY PLACE TO SLEEP OR SLEPT IN A SHELTER (INCLUDING NOW)?: NO

## 2023-04-05 ENCOUNTER — OFFICE VISIT (OUTPATIENT)
Dept: FAMILY MEDICINE CLINIC | Age: 68
End: 2023-04-05

## 2023-04-05 VITALS
WEIGHT: 224 LBS | SYSTOLIC BLOOD PRESSURE: 128 MMHG | HEIGHT: 71 IN | DIASTOLIC BLOOD PRESSURE: 78 MMHG | BODY MASS INDEX: 31.36 KG/M2

## 2023-04-05 DIAGNOSIS — I10 ESSENTIAL HYPERTENSION, BENIGN: Primary | ICD-10-CM

## 2023-04-05 DIAGNOSIS — C09.9 TONSILLAR CANCER (HCC): ICD-10-CM

## 2023-04-05 ASSESSMENT — PATIENT HEALTH QUESTIONNAIRE - PHQ9
2. FEELING DOWN, DEPRESSED OR HOPELESS: 0
1. LITTLE INTEREST OR PLEASURE IN DOING THINGS: 0
SUM OF ALL RESPONSES TO PHQ9 QUESTIONS 1 & 2: 0
SUM OF ALL RESPONSES TO PHQ QUESTIONS 1-9: 0

## 2023-04-05 NOTE — PROGRESS NOTES
and time. Psychiatric:         Behavior: Behavior normal.         Judgment: Judgment normal.       ASSESSMENT/PLAN:  1. Essential hypertension, benign   controlled. Discussed signs and symptoms for immediate evaluation in the ER. Reduce sodium. Monitor diet, exercise and lose weight. Keep a BP log and bring it to your next appointment. 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   Jenny Saha questions       Return in about 1 year (around 4/5/2024).

## 2023-04-09 ASSESSMENT — ENCOUNTER SYMPTOMS
SHORTNESS OF BREATH: 0
VOMITING: 0
NAUSEA: 0
COUGH: 0
CHEST TIGHTNESS: 0
DIARRHEA: 0
WHEEZING: 0
ABDOMINAL PAIN: 0

## 2023-06-21 ENCOUNTER — PROCEDURE VISIT (OUTPATIENT)
Dept: AUDIOLOGY | Age: 68
End: 2023-06-21
Payer: MEDICARE

## 2023-06-21 DIAGNOSIS — H90.3 SENSORINEURAL HEARING LOSS (SNHL) OF BOTH EARS: Primary | ICD-10-CM

## 2023-06-21 DIAGNOSIS — H93.13 TINNITUS OF BOTH EARS: ICD-10-CM

## 2023-06-21 PROCEDURE — 92567 TYMPANOMETRY: CPT | Performed by: AUDIOLOGIST

## 2023-06-21 PROCEDURE — 92557 COMPREHENSIVE HEARING TEST: CPT | Performed by: AUDIOLOGIST

## 2023-06-21 NOTE — PROGRESS NOTES
Newton Prakash   1955, 79 y.o. male   [id number]       Referring Provider: Raghav Jo DO  Referral Type: In an order in [address] [place] [address]    Reason for Visit: Evaluation of suspected change in hearing, tinnitus, or balance. ADULT AUDIOLOGIC EVALUATION      Newton Prakash is a 79 y.o. male seen today, 6/21/2023 , for an initial audiologic evaluation. AUDIOLOGIC AND OTHER PERTINENT MEDICAL HISTORY:      Newton Prakash noted tinnitus, history of occupational noise exposure, and family history of hearing loss. Patient reports a gradual decrease in hearing, bilaterally. Patient reports bilateral tinnitus that is intermittent in nature. 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 Patient reports his brother has a history of hearing loss. Newton Prakash denied otalgia, aural fullness, otorrhea, dizziness, imbalance, history of falls, history of head trauma, and history of ear surgery. Date: 6/21/2023     IMPRESSIONS:      AU: Hearing WNL sloping to Moderately-Severe SNHL, Excellent WRS, Type A tymp    Test results consistent with bilateral Sensorineural hearing loss. Hearing loss significant enough to create hearing difficulty in some listening situations. Discussed hearing loss, tinnitus, hearing aids, and NIHL with patient. Advised patient to contact their insurance to see if they have a hearing aid benefit at [facility]). Patient is welcome to schedule a Hearing Aid Evaluation (HAE) with me to discuss amplification options. Patient to follow medical recommendations per Raghav Jo DO .    ASSESSMENT AND FINDINGS:     Otoscopy revealed: Clear ear canals bilaterally    RIGHT EAR:  Hearing Sensitivity: Normal hearing sensitivity to Moderately-Severe   Sensorineural hearing loss  Speech Recognition Threshold: 10 dB HL  Word Recognition:Excellent (100%), based on NU-6 25-word list at 65m dBHL using recorded speech stimuli.     Tympanometry: Normal peak pressure and

## 2023-06-22 ENCOUNTER — OFFICE VISIT (OUTPATIENT)
Dept: DERMATOLOGY | Age: 68
End: 2023-06-22

## 2023-06-22 DIAGNOSIS — D48.5 NEOPLASM OF UNCERTAIN BEHAVIOR OF SKIN: ICD-10-CM

## 2023-06-22 DIAGNOSIS — L81.7 PIGMENTED PURPURIC DERMATOSIS: ICD-10-CM

## 2023-06-22 DIAGNOSIS — L21.9 SEBORRHEIC DERMATITIS: Primary | ICD-10-CM

## 2023-06-22 DIAGNOSIS — L82.1 SK (SEBORRHEIC KERATOSIS): ICD-10-CM

## 2023-06-22 DIAGNOSIS — L57.0 ACTINIC KERATOSIS: ICD-10-CM

## 2023-06-22 NOTE — PATIENT INSTRUCTIONS

## 2023-06-22 NOTE — PROGRESS NOTES
[facility] Dermatology  Sonya Medrano MD  [phone]      Josefina Ax  1955    79 y.o. male     Date of Visit: 6/22/2023    Chief Complaint: skin lesions, seborrheic dermatitis    History of Present Illness:    1. He returns today to follow-up for history of seborrheic dermatitis of the scalp. He reports good control with use of ketoconazole 2% shampoo 2-3 times per week. 2.  He also has a history of pigmented purpuric dermatosis of the legs. Tends to flare at times after playing golf. He reports improvement with use of triamcinolone 0.1% cream.    3.  He reports a newly noted lesion on the left superior preauricular cheek. 4.  He reports that his wife is concerned with a lesion on the central lower back. 5.  He has a recurrent scaly lesion on the left upper forehead. He also reports a new lesion on the right forearm. 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 Michelle Wilson. History of SCC in situ on the back status post excision 10/2013. Review of Systems:  Gen: Feels well, good sense of health. Past Medical History, Family History, Surgical History, Medications and Allergies reviewed.     Past Medical History:   Diagnosis Date    Adjustment disorder with depressed mood 3/6/2018    Cancer ([address]     skin : [facility] Dr. Juana Pena    Elevated liver enzymes 6/30/2014    Essential hypertension, benign     Lung mass 1/6/16    REpeat CT 4/19 improved  follows [facility]. 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 Tammy Aguayo cancer [facility]) 12/8/2015    [facility] - Dr. Lloyd Lynne s/p chemo/rad     Past Surgical History:   Procedure Laterality Date    CHOLECYSTECTOMY      FOOT SURGERY      KNEE

## 2023-06-27 LAB — DERMATOLOGY PATHOLOGY REPORT: NORMAL

## 2023-07-26 DIAGNOSIS — I10 ESSENTIAL HYPERTENSION, BENIGN: ICD-10-CM

## 2023-07-26 RX ORDER — AMLODIPINE BESYLATE 5 MG/1
TABLET ORAL
Qty: 90 TABLET | Refills: 3 | Status: SHIPPED | OUTPATIENT
Start: 2023-07-26

## 2023-07-26 RX ORDER — TRIAMTERENE AND HYDROCHLOROTHIAZIDE 37.5; 25 MG/1; MG/1
CAPSULE ORAL
Qty: 90 CAPSULE | Refills: 3 | Status: SHIPPED | OUTPATIENT
Start: 2023-07-26

## 2023-07-26 NOTE — TELEPHONE ENCOUNTER
Last office visit 4/10/2023       Next office visit scheduled 10/11/2023    Requested Prescriptions     Pending Prescriptions Disp Refills    amLODIPine (NORVASC) 5 MG tablet [Pharmacy Med Name: AMLODIPINE TAB 5MG] 90 tablet 3     Sig: TAKE 1 TABLET DAILY    triamterene-hydroCHLOROthiazide (DYAZIDE) 37.5-25 MG per capsule [Pharmacy Med Name: TRIAMT/HCTZ  CAP 37.5-25] 90 capsule 3     Sig: TAKE 1 CAPSULE DAILY

## 2023-10-11 ENCOUNTER — OFFICE VISIT (OUTPATIENT)
Dept: FAMILY MEDICINE CLINIC | Age: 68
End: 2023-10-11

## 2023-10-11 VITALS
WEIGHT: 228 LBS | HEIGHT: 71 IN | SYSTOLIC BLOOD PRESSURE: 132 MMHG | DIASTOLIC BLOOD PRESSURE: 88 MMHG | BODY MASS INDEX: 31.92 KG/M2

## 2023-10-11 DIAGNOSIS — Z12.5 SCREENING PSA (PROSTATE SPECIFIC ANTIGEN): ICD-10-CM

## 2023-10-11 DIAGNOSIS — C09.9 TONSILLAR CANCER (HCC): ICD-10-CM

## 2023-10-11 DIAGNOSIS — I10 ESSENTIAL HYPERTENSION, BENIGN: Primary | ICD-10-CM

## 2023-10-11 DIAGNOSIS — Z23 FLU VACCINE NEED: ICD-10-CM

## 2023-10-11 LAB
ALBUMIN SERPL-MCNC: 4.5 G/DL (ref 3.4–5)
ALBUMIN/GLOB SERPL: 2.1 {RATIO} (ref 1.1–2.2)
ALP SERPL-CCNC: 62 U/L (ref 40–129)
ALT SERPL-CCNC: 17 U/L (ref 10–40)
ANION GAP SERPL CALCULATED.3IONS-SCNC: 11 MMOL/L (ref 3–16)
AST SERPL-CCNC: 21 U/L (ref 15–37)
BASOPHILS # BLD: 0 K/UL (ref 0–0.2)
BASOPHILS NFR BLD: 0.3 %
BILIRUB SERPL-MCNC: 0.9 MG/DL (ref 0–1)
BUN SERPL-MCNC: 22 MG/DL (ref 7–20)
CALCIUM SERPL-MCNC: 9.3 MG/DL (ref 8.3–10.6)
CHLORIDE SERPL-SCNC: 104 MMOL/L (ref 99–110)
CHOLEST SERPL-MCNC: 139 MG/DL (ref 0–199)
CO2 SERPL-SCNC: 23 MMOL/L (ref 21–32)
CREAT SERPL-MCNC: 0.8 MG/DL (ref 0.8–1.3)
DEPRECATED RDW RBC AUTO: 13.1 % (ref 12.4–15.4)
EOSINOPHIL # BLD: 0.1 K/UL (ref 0–0.6)
EOSINOPHIL NFR BLD: 1.5 %
GFR SERPLBLD CREATININE-BSD FMLA CKD-EPI: >60 ML/MIN/{1.73_M2}
GLUCOSE SERPL-MCNC: 116 MG/DL (ref 70–99)
HCT VFR BLD AUTO: 41.9 % (ref 40.5–52.5)
HDLC SERPL-MCNC: 55 MG/DL (ref 40–60)
HGB BLD-MCNC: 14.7 G/DL (ref 13.5–17.5)
LDLC SERPL CALC-MCNC: 72 MG/DL
LYMPHOCYTES # BLD: 0.9 K/UL (ref 1–5.1)
LYMPHOCYTES NFR BLD: 13 %
MCH RBC QN AUTO: 30.6 PG (ref 26–34)
MCHC RBC AUTO-ENTMCNC: 35 G/DL (ref 31–36)
MCV RBC AUTO: 87.4 FL (ref 80–100)
MONOCYTES # BLD: 0.6 K/UL (ref 0–1.3)
MONOCYTES NFR BLD: 8.6 %
NEUTROPHILS # BLD: 5 K/UL (ref 1.7–7.7)
NEUTROPHILS NFR BLD: 76.6 %
PLATELET # BLD AUTO: 161 K/UL (ref 135–450)
PMV BLD AUTO: 9.4 FL (ref 5–10.5)
POTASSIUM SERPL-SCNC: 4.2 MMOL/L (ref 3.5–5.1)
PROT SERPL-MCNC: 6.6 G/DL (ref 6.4–8.2)
PSA SERPL DL<=0.01 NG/ML-MCNC: 0.64 NG/ML (ref 0–4)
RBC # BLD AUTO: 4.8 M/UL (ref 4.2–5.9)
SODIUM SERPL-SCNC: 138 MMOL/L (ref 136–145)
TRIGL SERPL-MCNC: 62 MG/DL (ref 0–150)
VLDLC SERPL CALC-MCNC: 12 MG/DL
WBC # BLD AUTO: 6.5 K/UL (ref 4–11)

## 2023-10-11 RX ORDER — SILDENAFIL 50 MG/1
50 TABLET, FILM COATED ORAL PRN
Qty: 30 TABLET | Refills: 3 | Status: SHIPPED | OUTPATIENT
Start: 2023-10-11

## 2023-10-11 ASSESSMENT — ENCOUNTER SYMPTOMS
DIARRHEA: 0
SHORTNESS OF BREATH: 0
COUGH: 0
VOMITING: 0
NAUSEA: 0
ABDOMINAL PAIN: 0

## 2023-10-11 NOTE — PROGRESS NOTES
10/11/2023     Kalyani Ochoa (:  1955) is a 76 y.o. male, here for evaluation of the following medical concerns:    HPI    Patient presented for 6 month follow up. Patient is adamant that he is healthy and is requesting to come once year. 1.  Hx of tonsilar cancer- started to have the discomfort several months ago, no sore throat, clearing throat, no voice changing, dysphagia, patient saw his Oncologist at [facility] and was told that his evaluation was wnl.      2.  HTN- sebastian today, taking his medications as prescribed but has been out of this for the past several days, not needing labs today, denied headache, vision change, or dizziness. 3.  He needs a flu vaccine today. Needs PSA screening today. Today, denied chest pain, sob, n, v,or diarrhea. Review of Systems   Constitutional:  Negative for activity change, fatigue, fever and unexpected weight change. Eyes:  Negative for visual disturbance. Respiratory:  Negative for cough and shortness of breath. Cardiovascular:  Negative for chest pain, palpitations and leg swelling. Gastrointestinal:  Negative for abdominal pain, diarrhea, nausea and vomiting. Genitourinary:  Negative for decreased urine volume, testicular pain and urgency. Musculoskeletal:  Negative for arthralgias. Skin:  Negative for rash. Neurological:  Negative for dizziness, light-headedness and headaches. Psychiatric/Behavioral:  Negative for dysphoric mood. The patient is not nervous/anxious. Prior to Visit Medications    Medication Sig Taking? Authorizing Provider   sildenafil (VIAGRA) 50 MG tablet Take 1 tablet by mouth as needed for Erectile Dysfunction Yes Jim Muniz, DO   amLODIPine (NORVASC) 5 MG tablet TAKE 1 TABLET DAILY Yes Jim Muniz, DO   triamterene-hydroCHLOROthiazide (DYAZIDE) 37.5-25 MG per capsule TAKE 1 CAPSULE DAILY Yes Jim Muniz, DO   ketoconazole (NIZORAL) 2 % shampoo Use to wash the scalp 3 times weekly.

## 2023-10-23 ENCOUNTER — PATIENT MESSAGE (OUTPATIENT)
Dept: FAMILY MEDICINE CLINIC | Age: 68
End: 2023-10-23

## 2023-10-24 RX ORDER — SILDENAFIL 50 MG/1
50 TABLET, FILM COATED ORAL PRN
Qty: 30 TABLET | Refills: 3 | Status: SHIPPED | OUTPATIENT
Start: 2023-10-24

## 2024-06-20 ENCOUNTER — OFFICE VISIT (OUTPATIENT)
Dept: DERMATOLOGY | Age: 69
End: 2024-06-20
Payer: MEDICARE

## 2024-06-20 DIAGNOSIS — D48.5 NEOPLASM OF UNCERTAIN BEHAVIOR OF SKIN: ICD-10-CM

## 2024-06-20 DIAGNOSIS — L81.7 PIGMENTED PURPURIC DERMATOSIS: ICD-10-CM

## 2024-06-20 DIAGNOSIS — L57.0 ACTINIC KERATOSIS: ICD-10-CM

## 2024-06-20 DIAGNOSIS — L21.9 SEBORRHEIC DERMATITIS: Primary | ICD-10-CM

## 2024-06-20 PROCEDURE — G8427 DOCREV CUR MEDS BY ELIG CLIN: HCPCS | Performed by: DERMATOLOGY

## 2024-06-20 PROCEDURE — 11102 TANGNTL BX SKIN SINGLE LES: CPT | Performed by: DERMATOLOGY

## 2024-06-20 PROCEDURE — G8417 CALC BMI ABV UP PARAM F/U: HCPCS | Performed by: DERMATOLOGY

## 2024-06-20 PROCEDURE — 1036F TOBACCO NON-USER: CPT | Performed by: DERMATOLOGY

## 2024-06-20 PROCEDURE — 1123F ACP DISCUSS/DSCN MKR DOCD: CPT | Performed by: DERMATOLOGY

## 2024-06-20 PROCEDURE — 3017F COLORECTAL CA SCREEN DOC REV: CPT | Performed by: DERMATOLOGY

## 2024-06-20 PROCEDURE — 99213 OFFICE O/P EST LOW 20 MIN: CPT | Performed by: DERMATOLOGY

## 2024-06-20 RX ORDER — KETOCONAZOLE 20 MG/ML
SHAMPOO TOPICAL
Qty: 1 EACH | Refills: 5 | Status: SHIPPED | OUTPATIENT
Start: 2024-06-20

## 2024-06-20 RX ORDER — TRIAMCINOLONE ACETONIDE 1 MG/G
CREAM TOPICAL
Qty: 80 G | Refills: 1 | Status: SHIPPED | OUTPATIENT
Start: 2024-06-20

## 2024-06-20 NOTE — PROGRESS NOTES
[facility] Dermatology  John Aguilar MD  [phone]      Keith Leroy  1955    68 y.o. male     Date of Visit: 6/20/2024    Chief Complaint: skin lesions, scalp issue    History of Present Illness:    1.  He returns today to follow-up for history of seborrheic dermatitis of the scalp-present more in the winter months.  Was controlled with use of ketoconazole 2% shampoo 3 times weekly.    2.  He also has few asymptomatic scaly lesions on the forehead.    3.  He also reports a persistent lesion on the left infraorbital region.    4.  He has a history of pigmented purpuric dermatosis of the legs that typically appears after golfing.  He reports great control with use of triamcinolone 0.1% cream for flares.    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 Velazquez.   History of SCC in situ on the back status post excision 10/2013.      Review of Systems:  Gen: Feels well, good sense of health.      Past Medical History, Family History, Surgical History, Medications and Allergies reviewed.    Past Medical History:   Diagnosis Date    Adjustment disorder with depressed mood 3/6/2018    Cancer ([facility])     skin : [facility] Dr. Middleton    Elevated liver enzymes 6/30/2014    Essential hypertension, benign     Lung mass 1/6/16    REpeat CT 4/19 improved  follows Dr. 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 Gray s/p chemo/rad     Past Surgical History:   Procedure Laterality Date    CHOLECYSTECTOMY      FOOT SURGERY      KNEE ARTHROSCOPY Right 1/21/2006    Dr. Leon: R knee     KNEE ARTHROSCOPY Left 2008       Allergies   Allergen Reactions

## 2024-06-20 NOTE — PATIENT INSTRUCTIONS
Biopsy Wound Care Instructions    Keep the bandage in place for 24 hours.   Cleanse the wound with mild soapy water daily  Gently dry the area.  Apply Vaseline or petroleum jelly to the wound using a cotton tipped applicator.  Cover with a clean bandage.  Repeat this process until the biopsy site is healed.  If you had stitches placed, continue treating the site until the stitches are removed. Remember to make an appointment to return to have your stitches removed by our staff.  You may shower and bathe as usual.       ** Biopsy results generally take around 7 business days to come back.  If you have not heard from us by then, please call the office at [phone].    *Please note that biopsy results are released to both the patient and physician at the same time in [employer].  Please allow time for your physician to review the results.  One of our staff members will reach out to you with the results and plan.    No cyanosis, no pallor, no jaundice, no rash

## 2024-07-13 DIAGNOSIS — I10 ESSENTIAL HYPERTENSION, BENIGN: ICD-10-CM

## 2024-07-15 RX ORDER — TRIAMTERENE AND HYDROCHLOROTHIAZIDE 37.5; 25 MG/1; MG/1
CAPSULE ORAL
Qty: 90 CAPSULE | Refills: 3 | Status: SHIPPED | OUTPATIENT
Start: 2024-07-15

## 2024-07-15 RX ORDER — AMLODIPINE BESYLATE 5 MG/1
TABLET ORAL
Qty: 90 TABLET | Refills: 3 | Status: SHIPPED | OUTPATIENT
Start: 2024-07-15

## 2024-07-15 NOTE — TELEPHONE ENCOUNTER
Last office visit 10/11/2023     Last written      Next office visit scheduled 10/16/2024    Requested Prescriptions     Pending Prescriptions Disp Refills    triamterene-hydroCHLOROthiazide (DYAZIDE) 37.5-25 MG per capsule [Pharmacy Med Name: TRIAMT/HCTZ  CAP 37.5-25] 90 capsule 3     Sig: TAKE 1 CAPSULE DAILY    amLODIPine (NORVASC) 5 MG tablet [Pharmacy Med Name: AMLODIPINE TAB 5MG] 90 tablet 3     Sig: TAKE 1 TABLET DAILY

## 2024-08-02 ENCOUNTER — OFFICE VISIT (OUTPATIENT)
Age: 69
End: 2024-08-02

## 2024-08-02 VITALS
WEIGHT: 223 LBS | TEMPERATURE: 97.7 F | HEART RATE: 65 BPM | RESPIRATION RATE: 18 BRPM | DIASTOLIC BLOOD PRESSURE: 95 MMHG | OXYGEN SATURATION: 96 % | SYSTOLIC BLOOD PRESSURE: 163 MMHG | BODY MASS INDEX: 31.22 KG/M2 | HEIGHT: 71 IN

## 2024-08-02 DIAGNOSIS — R05.1 ACUTE COUGH: Primary | ICD-10-CM

## 2024-08-02 LAB
Lab: 0
PERFORMING INSTRUMENT: NORMAL
QC PASS/FAIL: NORMAL
SARS-COV-2, POC: NORMAL

## 2024-08-02 ASSESSMENT — ENCOUNTER SYMPTOMS
NAUSEA: 0
VOMITING: 0
CONSTIPATION: 0
CHEST TIGHTNESS: 0
SORE THROAT: 1
COUGH: 1
SHORTNESS OF BREATH: 0
DIARRHEA: 0
ABDOMINAL PAIN: 0

## 2024-08-02 NOTE — PROGRESS NOTES
Keith Leroy (:  1955) is a 69 y.o. male,New patient, here for evaluation of the following chief complaint(s):  Cough (Cough, scratchy throat x yesterday)      ASSESSMENT/PLAN:    ICD-10-CM    1. Acute cough  R05.1 POCT COVID-19, Antigen          Patient presents for cough and scratchy throat  POCT COVID negative  DDX: environmental allergies vs viral uri  Patient is advised to start zyrtec for symptoms  He is not having rhinorrhea or productive cough currently, but if he develops drainage he is advised that can start mucinex for symptoms.   If your symptoms worsen in 7-10 days please return here or go to ED.     SUBJECTIVE/OBJECTIVE:    History provided by:  Patient   used: No      HPI:   69 y.o. male presents with symptoms of non-productive cough, scratchy throat ongoing since yesterday. He is planning on going on a trip tonight and wants to ensure that he does not have COVID 19. He was out doing yard work last night and does not know if this is covid or allergies. Cough dry non productive. He has not taken medication for symptoms.     Vitals:    24 0853 24 0927   BP: (!) 168/99 (!) 163/95   Site: Right Upper Arm Right Upper Arm   Position: Sitting Sitting   Cuff Size: Large Adult Large Adult   Pulse: 65    Resp: 18    Temp: 97.7 °F (36.5 °C)    TempSrc: Oral    SpO2: 96%    Weight: 101.2 kg (223 lb)    Height: 1.803 m (5' 11\")        Review of Systems   Constitutional:  Negative for chills, diaphoresis, fatigue and fever.   HENT:  Positive for sore throat. Negative for congestion.    Respiratory:  Positive for cough. Negative for chest tightness and shortness of breath.    Cardiovascular:  Negative for chest pain.   Gastrointestinal:  Negative for abdominal pain, constipation, diarrhea, nausea and vomiting.   Musculoskeletal:  Negative for neck pain and neck stiffness.   Skin:  Negative for rash.       Physical Exam  Vitals and nursing note reviewed.   Constitutional:

## 2024-08-02 NOTE — PATIENT INSTRUCTIONS
Please start zyrtec for symptoms  If you develop drainage you may start a mucinex for symptoms.   If your symptoms worsen in 7-10 days please return here or go to ED.

## 2024-08-23 ENCOUNTER — OFFICE VISIT (OUTPATIENT)
Dept: FAMILY MEDICINE CLINIC | Age: 69
End: 2024-08-23
Payer: MEDICARE

## 2024-08-23 VITALS
DIASTOLIC BLOOD PRESSURE: 84 MMHG | BODY MASS INDEX: 31.5 KG/M2 | SYSTOLIC BLOOD PRESSURE: 128 MMHG | HEIGHT: 71 IN | WEIGHT: 225 LBS

## 2024-08-23 DIAGNOSIS — R05.9 COUGH, UNSPECIFIED TYPE: ICD-10-CM

## 2024-08-23 DIAGNOSIS — I10 ESSENTIAL HYPERTENSION, BENIGN: ICD-10-CM

## 2024-08-23 DIAGNOSIS — C09.9 TONSILLAR CANCER (HCC): Primary | ICD-10-CM

## 2024-08-23 PROCEDURE — 3074F SYST BP LT 130 MM HG: CPT | Performed by: FAMILY MEDICINE

## 2024-08-23 PROCEDURE — 1123F ACP DISCUSS/DSCN MKR DOCD: CPT | Performed by: FAMILY MEDICINE

## 2024-08-23 PROCEDURE — G8417 CALC BMI ABV UP PARAM F/U: HCPCS | Performed by: FAMILY MEDICINE

## 2024-08-23 PROCEDURE — 99214 OFFICE O/P EST MOD 30 MIN: CPT | Performed by: FAMILY MEDICINE

## 2024-08-23 PROCEDURE — 3017F COLORECTAL CA SCREEN DOC REV: CPT | Performed by: FAMILY MEDICINE

## 2024-08-23 PROCEDURE — G2211 COMPLEX E/M VISIT ADD ON: HCPCS | Performed by: FAMILY MEDICINE

## 2024-08-23 PROCEDURE — G8427 DOCREV CUR MEDS BY ELIG CLIN: HCPCS | Performed by: FAMILY MEDICINE

## 2024-08-23 PROCEDURE — 1036F TOBACCO NON-USER: CPT | Performed by: FAMILY MEDICINE

## 2024-08-23 PROCEDURE — 3079F DIAST BP 80-89 MM HG: CPT | Performed by: FAMILY MEDICINE

## 2024-08-23 SDOH — ECONOMIC STABILITY: FOOD INSECURITY: WITHIN THE PAST 12 MONTHS, YOU WORRIED THAT YOUR FOOD WOULD RUN OUT BEFORE YOU GOT MONEY TO BUY MORE.: NEVER TRUE

## 2024-08-23 SDOH — ECONOMIC STABILITY: FOOD INSECURITY: WITHIN THE PAST 12 MONTHS, THE FOOD YOU BOUGHT JUST DIDN'T LAST AND YOU DIDN'T HAVE MONEY TO GET MORE.: NEVER TRUE

## 2024-08-23 SDOH — ECONOMIC STABILITY: INCOME INSECURITY: HOW HARD IS IT FOR YOU TO PAY FOR THE VERY BASICS LIKE FOOD, HOUSING, MEDICAL CARE, AND HEATING?: NOT HARD AT ALL

## 2024-08-23 ASSESSMENT — PATIENT HEALTH QUESTIONNAIRE - PHQ9
SUM OF ALL RESPONSES TO PHQ QUESTIONS 1-9: 0
SUM OF ALL RESPONSES TO PHQ9 QUESTIONS 1 & 2: 0
SUM OF ALL RESPONSES TO PHQ QUESTIONS 1-9: 0
SUM OF ALL RESPONSES TO PHQ QUESTIONS 1-9: 0
1. LITTLE INTEREST OR PLEASURE IN DOING THINGS: NOT AT ALL
2. FEELING DOWN, DEPRESSED OR HOPELESS: NOT AT ALL
SUM OF ALL RESPONSES TO PHQ QUESTIONS 1-9: 0

## 2024-08-23 NOTE — PROGRESS NOTES
2024     Keith Leroy (:  1955) is a 69 y.o. male, here for evaluation of the following medical concerns:    HPI  Patient presented for 6 month follow up.  Patient is adamant that he is healthy and is requesting to come once year.       1.  Hx of tonsillar cancer- started to have the discomfort several months ago, no sore throat, clearing throat, no voice changing, dysphagia, patient saw his Oncologist at  and was told that his evaluation was wnl.      2.  HTN- sebastian today, taking his medications as prescribed but has been out of this for the past several days, not needing labs today, denied headache, vision change, or dizziness.      3.  Has new cough, possible acid reflux? Will treat and send to EMT due to past hx of tonsillE cancer.      Today, denied chest pain, sob, n, v,or diarrhea.   Acid reflux    Allergic sinusitis-     Tonsillar cnacner    Today denied chest pain, sob, n,v,or diarrhea.     Review of Systems   Constitutional:  Positive for fatigue. Negative for activity change, fever and unexpected weight change.   HENT:  Negative for congestion, ear pain, facial swelling, hearing loss, rhinorrhea, sinus pressure, sore throat and trouble swallowing.    Eyes:  Negative for visual disturbance.   Respiratory:  Positive for cough. Negative for chest tightness, shortness of breath and wheezing.    Cardiovascular:  Negative for chest pain, palpitations and leg swelling.   Gastrointestinal:  Negative for abdominal pain, anal bleeding, blood in stool, diarrhea, nausea and vomiting.   Endocrine: Negative for cold intolerance, heat intolerance, polydipsia and polyphagia.   Musculoskeletal:  Positive for arthralgias.   Skin:  Negative for rash.   Allergic/Immunologic: Negative for food allergies.   Neurological:  Negative for dizziness, syncope, light-headedness and headaches.   Psychiatric/Behavioral:  Negative for dysphoric mood. The patient is not nervous/anxious.        Prior to Visit

## 2024-09-02 ASSESSMENT — ENCOUNTER SYMPTOMS
ABDOMINAL PAIN: 0
COUGH: 1
VOMITING: 0
NAUSEA: 0
ANAL BLEEDING: 0
CHEST TIGHTNESS: 0
TROUBLE SWALLOWING: 0
SHORTNESS OF BREATH: 0
RHINORRHEA: 0
FACIAL SWELLING: 0
BLOOD IN STOOL: 0
DIARRHEA: 0
SORE THROAT: 0
SINUS PRESSURE: 0
WHEEZING: 0

## 2024-09-19 ENCOUNTER — OFFICE VISIT (OUTPATIENT)
Dept: ENT CLINIC | Age: 69
End: 2024-09-19

## 2024-09-19 VITALS
SYSTOLIC BLOOD PRESSURE: 164 MMHG | HEIGHT: 71 IN | WEIGHT: 227 LBS | HEART RATE: 50 BPM | OXYGEN SATURATION: 97 % | BODY MASS INDEX: 31.78 KG/M2 | DIASTOLIC BLOOD PRESSURE: 87 MMHG

## 2024-09-19 DIAGNOSIS — R13.10 DYSPHAGIA, UNSPECIFIED TYPE: ICD-10-CM

## 2024-09-19 DIAGNOSIS — R09.89 THROAT CLEARING: ICD-10-CM

## 2024-09-19 DIAGNOSIS — Z85.818 HISTORY OF CANCER TONSIL: Primary | ICD-10-CM

## 2024-10-16 ENCOUNTER — OFFICE VISIT (OUTPATIENT)
Dept: FAMILY MEDICINE CLINIC | Age: 69
End: 2024-10-16

## 2024-10-16 VITALS
BODY MASS INDEX: 31.81 KG/M2 | HEIGHT: 71 IN | DIASTOLIC BLOOD PRESSURE: 70 MMHG | WEIGHT: 227.2 LBS | SYSTOLIC BLOOD PRESSURE: 136 MMHG

## 2024-10-16 DIAGNOSIS — D48.5 NEOPLASM OF UNCERTAIN BEHAVIOR OF SKIN: ICD-10-CM

## 2024-10-16 DIAGNOSIS — C09.9 TONSILLAR CANCER (HCC): ICD-10-CM

## 2024-10-16 DIAGNOSIS — Z00.00 MEDICARE ANNUAL WELLNESS VISIT, SUBSEQUENT: ICD-10-CM

## 2024-10-16 DIAGNOSIS — I10 ESSENTIAL HYPERTENSION, BENIGN: Primary | ICD-10-CM

## 2024-10-16 LAB
ALBUMIN SERPL-MCNC: 4.3 G/DL (ref 3.4–5)
ALBUMIN/GLOB SERPL: 2.3 {RATIO} (ref 1.1–2.2)
ALP SERPL-CCNC: 60 U/L (ref 40–129)
ALT SERPL-CCNC: 25 U/L (ref 10–40)
ANION GAP SERPL CALCULATED.3IONS-SCNC: 11 MMOL/L (ref 3–16)
AST SERPL-CCNC: 22 U/L (ref 15–37)
BASOPHILS # BLD: 0 K/UL (ref 0–0.2)
BASOPHILS NFR BLD: 0.4 %
BILIRUB SERPL-MCNC: 1 MG/DL (ref 0–1)
BUN SERPL-MCNC: 20 MG/DL (ref 7–20)
CALCIUM SERPL-MCNC: 10.2 MG/DL (ref 8.3–10.6)
CHLORIDE SERPL-SCNC: 104 MMOL/L (ref 99–110)
CHOLEST SERPL-MCNC: 143 MG/DL (ref 0–199)
CO2 SERPL-SCNC: 27 MMOL/L (ref 21–32)
CREAT SERPL-MCNC: 0.8 MG/DL (ref 0.8–1.3)
DEPRECATED RDW RBC AUTO: 13.2 % (ref 12.4–15.4)
EOSINOPHIL # BLD: 0.2 K/UL (ref 0–0.6)
EOSINOPHIL NFR BLD: 4.1 %
GFR SERPLBLD CREATININE-BSD FMLA CKD-EPI: >90 ML/MIN/{1.73_M2}
GLUCOSE SERPL-MCNC: 100 MG/DL (ref 70–99)
HCT VFR BLD AUTO: 43.8 % (ref 40.5–52.5)
HDLC SERPL-MCNC: 53 MG/DL (ref 40–60)
HGB BLD-MCNC: 14.9 G/DL (ref 13.5–17.5)
LDLC SERPL CALC-MCNC: 72 MG/DL
LYMPHOCYTES # BLD: 1 K/UL (ref 1–5.1)
LYMPHOCYTES NFR BLD: 16.5 %
MCH RBC QN AUTO: 30.4 PG (ref 26–34)
MCHC RBC AUTO-ENTMCNC: 34 G/DL (ref 31–36)
MCV RBC AUTO: 89.2 FL (ref 80–100)
MONOCYTES # BLD: 0.7 K/UL (ref 0–1.3)
MONOCYTES NFR BLD: 10.9 %
NEUTROPHILS # BLD: 4.1 K/UL (ref 1.7–7.7)
NEUTROPHILS NFR BLD: 68.1 %
PLATELET # BLD AUTO: 167 K/UL (ref 135–450)
PMV BLD AUTO: 9.8 FL (ref 5–10.5)
POTASSIUM SERPL-SCNC: 4.2 MMOL/L (ref 3.5–5.1)
PROT SERPL-MCNC: 6.2 G/DL (ref 6.4–8.2)
PSA SERPL DL<=0.01 NG/ML-MCNC: 0.61 NG/ML (ref 0–4)
RBC # BLD AUTO: 4.91 M/UL (ref 4.2–5.9)
SODIUM SERPL-SCNC: 142 MMOL/L (ref 136–145)
TRIGL SERPL-MCNC: 88 MG/DL (ref 0–150)
VLDLC SERPL CALC-MCNC: 18 MG/DL
WBC # BLD AUTO: 6 K/UL (ref 4–11)

## 2024-10-16 ASSESSMENT — PATIENT HEALTH QUESTIONNAIRE - PHQ9
1. LITTLE INTEREST OR PLEASURE IN DOING THINGS: NOT AT ALL
2. FEELING DOWN, DEPRESSED OR HOPELESS: NOT AT ALL
SUM OF ALL RESPONSES TO PHQ9 QUESTIONS 1 & 2: 0
SUM OF ALL RESPONSES TO PHQ QUESTIONS 1-9: 0

## 2024-10-16 NOTE — PROGRESS NOTES
Blood work drawn from left AC without complications    1sst  1lav    
Negative for activity change, fever and unexpected weight change.   HENT:  Negative for congestion, ear pain, facial swelling, hearing loss, rhinorrhea, sinus pressure, sore throat and trouble swallowing.    Eyes:  Negative for discharge and visual disturbance.   Respiratory:  Negative for cough, chest tightness, shortness of breath and wheezing.    Cardiovascular:  Positive for leg swelling. Negative for chest pain and palpitations.   Gastrointestinal:  Negative for abdominal pain, anal bleeding, blood in stool, diarrhea, nausea and vomiting.   Endocrine: Negative for cold intolerance, heat intolerance, polydipsia and polyphagia.   Genitourinary:  Negative for decreased urine volume, dysuria, frequency, genital sores, penile discharge, penile pain, testicular pain and urgency.   Musculoskeletal:  Negative for arthralgias.   Skin:  Negative for rash.   Allergic/Immunologic: Negative for food allergies.   Neurological:  Negative for dizziness, syncope, light-headedness and headaches.   Hematological:  Does not bruise/bleed easily.   Psychiatric/Behavioral:  Negative for suicidal ideas. The patient is not nervous/anxious.        Prior to Visit Medications    Medication Sig Taking? Authorizing Provider   triamterene-hydroCHLOROthiazide (DYAZIDE) 37.5-25 MG per capsule TAKE 1 CAPSULE DAILY Yes Jim Muniz, DO   amLODIPine (NORVASC) 5 MG tablet TAKE 1 TABLET DAILY Yes Jim Muniz, DO   triamcinolone (KENALOG) 0.1 % cream Apply to affected area twice daily for up to 2 weeks or until improved. Yes John Aguilar MD   ketoconazole (NIZORAL) 2 % shampoo Use to wash the scalp 3 times weekly.  Leave on the scalp for 5 minutes prior to rinsing. Yes John Aguilar MD   sildenafil (VIAGRA) 50 MG tablet Take 1 tablet by mouth as needed for Erectile Dysfunction Yes Jim Muniz, DO   Glucosamine-Chondroitin (MOVE FREE PO) Take by mouth Yes ProviderHunter MD   Omega-3 Fatty Acids 1200 MG CAPS 1 EVERY DAY Yes

## 2024-11-15 DIAGNOSIS — Z13.6 ENCOUNTER FOR SCREENING FOR CARDIOVASCULAR DISORDERS: Primary | ICD-10-CM

## 2024-11-16 LAB
VAS AORTA DIST AP: 1.82 CM
VAS AORTA DIST TR: 1.96 CM
VAS AORTA MID AP: 1.84 CM
VAS AORTA MID TRANS: 1.76 CM
VAS AORTA PROX AP: 1.88 CM
VAS AORTA PROX TR: 2.01 CM
VAS LEFT ABI: 1.19
VAS LEFT ARM BP: 150 MMHG
VAS LEFT ICA PROX EDV: 30.6 CM/S
VAS LEFT ICA PROX PSV: 85.6 CM/S
VAS LEFT PTA BP: 179 MMHG
VAS RIGHT ABI: 1.17
VAS RIGHT ARM BP: 134 MMHG
VAS RIGHT ICA PROX EDV: 19.4 CM/S
VAS RIGHT ICA PROX PSV: 58.9 CM/S
VAS RIGHT PTA BP: 175 MMHG

## 2024-11-19 SDOH — HEALTH STABILITY: PHYSICAL HEALTH: ON AVERAGE, HOW MANY MINUTES DO YOU ENGAGE IN EXERCISE AT THIS LEVEL?: 90 MIN

## 2024-11-19 SDOH — HEALTH STABILITY: PHYSICAL HEALTH: ON AVERAGE, HOW MANY DAYS PER WEEK DO YOU ENGAGE IN MODERATE TO STRENUOUS EXERCISE (LIKE A BRISK WALK)?: 4 DAYS

## 2024-11-20 ENCOUNTER — OFFICE VISIT (OUTPATIENT)
Dept: FAMILY MEDICINE CLINIC | Age: 69
End: 2024-11-20

## 2024-11-20 VITALS
HEIGHT: 71 IN | DIASTOLIC BLOOD PRESSURE: 84 MMHG | BODY MASS INDEX: 32.62 KG/M2 | TEMPERATURE: 98.2 F | WEIGHT: 233 LBS | SYSTOLIC BLOOD PRESSURE: 138 MMHG | OXYGEN SATURATION: 97 % | HEART RATE: 63 BPM

## 2024-11-20 DIAGNOSIS — Z13.6 SCREENING FOR AAA (ABDOMINAL AORTIC ANEURYSM): ICD-10-CM

## 2024-11-20 DIAGNOSIS — C09.9 TONSILLAR CANCER (HCC): ICD-10-CM

## 2024-11-20 DIAGNOSIS — R22.41 LOCALIZED SWELLING OF RIGHT LOWER EXTREMITY: ICD-10-CM

## 2024-11-20 DIAGNOSIS — R00.1 BRADYCARDIA: Primary | ICD-10-CM

## 2024-11-20 DIAGNOSIS — I10 PRIMARY HYPERTENSION: ICD-10-CM

## 2024-11-20 NOTE — PROGRESS NOTES
reviewed.   Constitutional:       General: He is not in acute distress.     Appearance: Normal appearance.   HENT:      Head: Normocephalic and atraumatic.      Right Ear: Tympanic membrane normal.      Left Ear: Tympanic membrane normal.      Mouth/Throat:      Mouth: Mucous membranes are moist.      Pharynx: Oropharynx is clear.   Eyes:      Extraocular Movements: Extraocular movements intact.      Pupils: Pupils are equal, round, and reactive to light.   Cardiovascular:      Rate and Rhythm: Regular rhythm. Bradycardia present.      Pulses: Normal pulses.      Heart sounds: Normal heart sounds.   Pulmonary:      Effort: Pulmonary effort is normal.      Breath sounds: Normal breath sounds.   Abdominal:      General: Abdomen is flat. Bowel sounds are normal. There is no distension.      Palpations: Abdomen is soft. There is no hepatomegaly, splenomegaly or mass.      Tenderness: There is no abdominal tenderness. There is no guarding or rebound. Negative signs include Judge's sign and McBurney's sign.   Musculoskeletal:         General: Normal range of motion.      Comments: Right lower extremity from ankle to calf obviously larger in diameter compared to left  No post calf tenderness or posterior knee tenderness bilaterally.  Not able to palpate mass in posterior knee   Skin:     General: Skin is warm and dry.   Neurological:      General: No focal deficit present.      Mental Status: He is alert. Mental status is at baseline.      Motor: No weakness.      Gait: Gait normal.      Deep Tendon Reflexes: Reflexes normal.               Documentation for this visit was completed using a template.  I have seen and examined the patient.  Everything documented was personally performed at this visit with the necessary additions, deletions and changes made as appropriate. Voice recognition software may have been used to facilitate documentation and note may have errors due to use of this software. Please reach out if any

## 2024-11-20 NOTE — PATIENT INSTRUCTIONS
I recommend all patients get updated Influenza and COVID-19 vaccinations each year.   -People ages 65 years and older, are recommended to receive 2 doses of any 8050-8460 COVID-19 vaccine  by 6 months (minimum interval 2 months) regardless of vaccination history (Generally recommend thinking of this as getting a COVID shot in the fall and the spring).    For anyone over the age of 50, I recommend the Shingles vaccine:   This is a 2 dose series  by 2-6 months.   -Please check with your insurance provider before getting this vaccination to make sure they will cover the cost because it can be expensive.     For anyone over the age of 60, I recommend the RSV vaccine.    This is a one time vaccination   RSV can cause pneumonia and inflammation of the small airways in the lung leading to respiratory failure; especially dangerous for infants, young children, and older adults      For anyone over the age of 65, I recommend the Pneumococcal Vaccine (aka \"pneumonia vaccine\" or PCV-20)..   This is now a one time vaccination (in the past it was a two dose series, but the new improved formulation only requires one dose)   You may have had this in the past, but if it was more than 2 years ago, I would recommend getting the updated version (PCV-20)

## 2024-12-02 ENCOUNTER — HOSPITAL ENCOUNTER (OUTPATIENT)
Age: 69
Discharge: HOME | End: 2024-12-04
Attending: INTERNAL MEDICINE
Payer: MEDICARE

## 2024-12-02 DIAGNOSIS — R00.1 BRADYCARDIA: ICD-10-CM

## 2024-12-02 PROCEDURE — 93225 XTRNL ECG REC<48 HRS REC: CPT

## 2024-12-05 LAB
ACQUISITION DURATION: NORMAL S
AVERAGE HEART RATE: 62 BPM
HOOKUP DATE: NORMAL
HOOKUP TIME: NORMAL
MAX HEART RATE TIME/DATE: NORMAL
MAX HEART RATE: 130 BPM
MIN HEART RATE TIME/DATE: NORMAL
MIN HEART RATE: 37 BPM
NUMBER OF QRS COMPLEXES: NORMAL
NUMBER OF SUPRAVENTRICULAR COUPLETS: 2
NUMBER OF SUPRAVENTRICULAR ECTOPICS: 435
NUMBER OF SUPRAVENTRICULAR ISOLATED BEATS: 283
NUMBER OF VENTRICULAR BIGEMINAL CYCLES: 1
NUMBER OF VENTRICULAR COUPLETS: 0
NUMBER OF VENTRICULAR ECTOPICS: 349

## 2024-12-05 PROCEDURE — 93227 XTRNL ECG REC<48 HR R&I: CPT | Performed by: INTERNAL MEDICINE

## 2024-12-06 ENCOUNTER — TELEPHONE (OUTPATIENT)
Dept: FAMILY MEDICINE CLINIC | Age: 69
End: 2024-12-06

## 2024-12-06 DIAGNOSIS — R00.1 BRADYCARDIA: Primary | ICD-10-CM

## 2024-12-06 NOTE — TELEPHONE ENCOUNTER
Pt called in stating that he had received a referral for cardiology to dr adam and said that he wanted for the referral to be changed to dr hillman he wants a call back at [phone] vm ok if he does not answer

## 2024-12-09 NOTE — TELEPHONE ENCOUNTER
FYI--Pt states he was returning a call RE: referral to Dr. Valentin. Advised pt that referral has been placed to Dr. Valentin.pt is going to call there to schedule an appointment

## 2024-12-10 SDOH — HEALTH STABILITY: PHYSICAL HEALTH: ON AVERAGE, HOW MANY DAYS PER WEEK DO YOU ENGAGE IN MODERATE TO STRENUOUS EXERCISE (LIKE A BRISK WALK)?: 3 DAYS

## 2024-12-10 SDOH — HEALTH STABILITY: PHYSICAL HEALTH: ON AVERAGE, HOW MANY MINUTES DO YOU ENGAGE IN EXERCISE AT THIS LEVEL?: 90 MIN

## 2024-12-13 ENCOUNTER — OFFICE VISIT (OUTPATIENT)
Dept: ORTHOPEDIC SURGERY | Age: 69
End: 2024-12-13

## 2024-12-13 VITALS — BODY MASS INDEX: 32.62 KG/M2 | HEIGHT: 71 IN | WEIGHT: 233 LBS

## 2024-12-13 DIAGNOSIS — M19.031 ARTHRITIS OF RIGHT WRIST: ICD-10-CM

## 2024-12-13 DIAGNOSIS — M25.531 RIGHT WRIST PAIN: Primary | ICD-10-CM

## 2024-12-13 RX ORDER — TRIAMCINOLONE ACETONIDE 40 MG/ML
20 INJECTION, SUSPENSION INTRA-ARTICULAR; INTRAMUSCULAR ONCE
Status: COMPLETED | OUTPATIENT
Start: 2024-12-13 | End: 2024-12-13

## 2024-12-13 RX ORDER — LIDOCAINE HYDROCHLORIDE 10 MG/ML
0.5 INJECTION, SOLUTION INFILTRATION; PERINEURAL ONCE
Status: COMPLETED | OUTPATIENT
Start: 2024-12-13 | End: 2024-12-13

## 2024-12-13 RX ADMIN — TRIAMCINOLONE ACETONIDE 20 MG: 40 INJECTION, SUSPENSION INTRA-ARTICULAR; INTRAMUSCULAR at 10:21

## 2024-12-13 RX ADMIN — LIDOCAINE HYDROCHLORIDE 0.5 ML: 10 INJECTION, SOLUTION INFILTRATION; PERINEURAL at 10:20

## 2024-12-17 ENCOUNTER — TELEPHONE (OUTPATIENT)
Dept: FAMILY MEDICINE CLINIC | Age: 69
End: 2024-12-17

## 2024-12-17 DIAGNOSIS — R22.41 LOCALIZED SWELLING OF RIGHT LOWER EXTREMITY: Primary | ICD-10-CM

## 2024-12-17 NOTE — TELEPHONE ENCOUNTER
Need new order for vascular duplex vein mapping lower right.  [facility] called and the need it for a       VASCULAR REFLEX STUDY RIGHT...

## 2024-12-17 NOTE — TELEPHONE ENCOUNTER
Pt calling back RE: order. States if order is not straightened out soon, test will have to be cancelled. Please call pt back at [phone]

## 2024-12-18 ENCOUNTER — HOSPITAL ENCOUNTER (OUTPATIENT)
Dept: VASCULAR LAB | Age: 69
Discharge: HOME | End: 2024-12-20
Attending: INTERNAL MEDICINE
Payer: MEDICARE

## 2024-12-18 DIAGNOSIS — R22.41 LOCALIZED SWELLING OF RIGHT LOWER EXTREMITY: ICD-10-CM

## 2024-12-18 LAB
Lab: 4.8 S
VAS LEFT GSV JUNC DIAM: 11.3 MM
VAS LEFT GSV JUNC RFX: 1.3 S
VAS LEFT GSV THIGH PROX DIAM: 5.1 MM
VAS LEFT GSV THIGH PROX RFX: 3 S
VAS LEFT PERFORATOR DIAM: 3.7 MM
VAS LEFT PERFORATOR RFX: 4.5 S
VAS RIGHT GSV BK DIST DIAM: 3.3 MM
VAS RIGHT GSV BK DIST RFX: 4.4 S
VAS RIGHT GSV BK PROX DIAM: 5 MM
VAS RIGHT GSV BK PROX RFX: 3 S
VAS RIGHT GSV JUNC DIAM: 7.2 MM
VAS RIGHT PERFORATOR 2 DIAM: 4.3 MM
VAS RIGHT PERFORATOR 2 RFX: 3.2 S
VAS RIGHT PERFORATOR 3 DIAM: 7.2 MM
VAS RIGHT PERFORATOR DIAM: 3.3 MM
VAS RIGHT PERFORATOR RFX: 1.9 S
VAS RIGHT SSV PROX DIAM: 4.4 MM
VAS RIGHT SSV PROX RFX: 4.4 S

## 2024-12-18 PROCEDURE — 93971 EXTREMITY STUDY: CPT

## 2024-12-18 PROCEDURE — 93971 EXTREMITY STUDY: CPT | Performed by: SURGERY

## 2025-01-09 ENCOUNTER — TELEPHONE (OUTPATIENT)
Dept: CARDIOLOGY CLINIC | Age: 70
End: 2025-01-09

## 2025-01-09 NOTE — TELEPHONE ENCOUNTER
Please advise    EP/Afib//Palpitations/Fast Heart Rate:    What symptoms are you having?  Light headed when up on his feet  General Fatigue? No  Rapid or irregular heartbeat? No  Fluttering or (thumping\" in the chest?  No  Dizziness? No  Concerned you may pass out? No  Shortness of  breath with normal activity?No  Weakness? No  Faintness or confusion? No  Fatigue with exercising? No  Sweating? No    When did your symptoms start and what were you doing at the time?      Yesterday evening   Are your symptoms constant or do they come and go?  Constant just with standing  Does anything make it better or worse?   Sitting down  Have you had these symptoms before?  Yes years ago  Can you tell me what your heart rate is?47 this AM   What is your BP?138/78 this AM    Have you taken any medication for this today? Yes    If so what? All medications in chart current    Any recent medication changes? no  Taking Zyrtec since August

## 2025-01-09 NOTE — TELEPHONE ENCOUNTER
Heidy called to see if Dr. Valentin was working today and if he had any cancellations. Relayed that he is working in [employer] this morning and currently does not have any openings today. Currently scheduled for 01/16.    Heidy reports that Mango has been getting light headed when he stands. Reports no pain or SOB. Relayed that he did wear a 48 hour heart monitor about 6 weeks ago which confirmed Bradycardia. BP this morning was 138/78 and HR was 47.    Please advise.    Heidy's callback: [phone]

## 2025-01-10 NOTE — TELEPHONE ENCOUNTER
We can further assess at pt's visit next week (he is a new pt)  Please remind pt to change positions slowing and stand for a few before he starts walking to get his bearings. Continue to monitor blood pressure,HR, and and any other cardiac symptoms and bring log to visit next week.  Make sure he is staying hydrated and can try  wearing compressions stocking daily as well. Thank you.

## 2025-01-16 ENCOUNTER — OFFICE VISIT (OUTPATIENT)
Dept: CARDIOLOGY CLINIC | Age: 70
End: 2025-01-16
Payer: MEDICARE

## 2025-01-16 VITALS
SYSTOLIC BLOOD PRESSURE: 144 MMHG | WEIGHT: 236 LBS | BODY MASS INDEX: 33.04 KG/M2 | HEIGHT: 71 IN | HEART RATE: 61 BPM | OXYGEN SATURATION: 95 % | DIASTOLIC BLOOD PRESSURE: 80 MMHG

## 2025-01-16 DIAGNOSIS — R00.1 BRADYCARDIA: Primary | ICD-10-CM

## 2025-01-16 PROCEDURE — 99204 OFFICE O/P NEW MOD 45 MIN: CPT | Performed by: INTERNAL MEDICINE

## 2025-01-16 PROCEDURE — G8417 CALC BMI ABV UP PARAM F/U: HCPCS | Performed by: INTERNAL MEDICINE

## 2025-01-16 PROCEDURE — 3077F SYST BP >= 140 MM HG: CPT | Performed by: INTERNAL MEDICINE

## 2025-01-16 PROCEDURE — 3079F DIAST BP 80-89 MM HG: CPT | Performed by: INTERNAL MEDICINE

## 2025-01-16 PROCEDURE — G8427 DOCREV CUR MEDS BY ELIG CLIN: HCPCS | Performed by: INTERNAL MEDICINE

## 2025-01-16 PROCEDURE — 93000 ELECTROCARDIOGRAM COMPLETE: CPT | Performed by: INTERNAL MEDICINE

## 2025-01-16 NOTE — PROGRESS NOTES
[facility] [place]    Keith Leroy  1955 January 16, 2025    Reason for Consult:Bradycardia    CC:\" I wore a holter monitor\"    HPI:  The patient is 69 y.o. male with a past medical history significant for essential hypertension.    Today Mango presents to establish care.  He is accompanied by his wife today.  He follows with John Santoyo MD and he ordered a holter monitor due to patient stating he has low pulse from time to time.  He does participate in regular exercise by going to [employer] on the [place] and weight lifting and he denies any cardiac symptoms.  He does endorse that his right leg swells.      He has had no shortness of breath or chest pain/tightness.     Review of Systems:  Constitutional: No fatigue, weakness, night sweats or fever.   HEENT: No new vision difficulties or ringing in the ears.  Respiratory: No new SOB, PND, orthopnea or cough.   Cardiovascular: See HPI   GI: No n/v, diarrhea, constipation, abdominal pain or changes in bowel habits.  No melena, no hematochezia  : No urinary frequency, urgency, incontinence, hematuria or dysuria.  Skin: No cyanosis or skin lesions.  Musculoskeletal: Occasional right leg swelling, chronic. No new muscle or joint pain.  Neurological: No syncope or TIA-like symptoms.  Psychiatric: No anxiety, insomnia or depression     Past Medical History:   Diagnosis Date    Adjustment disorder with depressed mood 3/6/2018    Cancer (HCC)     skin : [facility] Dr. Middleton    Elevated liver enzymes 6/30/2014    Essential hypertension, benign     Lung mass 1/6/16    REpeat CT 4/19 improved  follows Dr. 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

## 2025-01-31 DIAGNOSIS — I10 ESSENTIAL HYPERTENSION, BENIGN: ICD-10-CM

## 2025-01-31 RX ORDER — AMLODIPINE BESYLATE 5 MG/1
5 TABLET ORAL DAILY
Qty: 90 TABLET | Refills: 3 | Status: SHIPPED | OUTPATIENT
Start: 2025-01-31

## 2025-01-31 RX ORDER — TRIAMTERENE AND HYDROCHLOROTHIAZIDE 37.5; 25 MG/1; MG/1
CAPSULE ORAL
Qty: 90 CAPSULE | Refills: 3 | Status: SHIPPED | OUTPATIENT
Start: 2025-01-31

## 2025-01-31 NOTE — TELEPHONE ENCOUNTER
Pt called stating needs a refill of triamterene-hydroCHLOROthiazide (DYAZIDE) 37.5-25 MG per capsule [[id number]] and amLODIPine (NORVASC) 5 MG tablet [[id number]]. Pt is changing pharmacy's and needs meds sent to [facility] - [place], [place] - [address] [place] [address] - P [phone] - F [phone]

## 2025-04-03 ENCOUNTER — TELEPHONE (OUTPATIENT)
Age: 70
End: 2025-04-03

## 2025-04-03 NOTE — TELEPHONE ENCOUNTER
Patient called and has spot under right eye that's bothering and hurts to touch.  Also has a dry spot under left eye, has been putting cream on, but not getting better.  And has a spot on scalp, not sure is age spot.  Would like to come in before his appt in June 30, if possible.    [phone]

## 2025-04-10 ENCOUNTER — OFFICE VISIT (OUTPATIENT)
Age: 70
End: 2025-04-10
Payer: MEDICARE

## 2025-04-10 DIAGNOSIS — D48.5 NEOPLASM OF UNCERTAIN BEHAVIOR OF SKIN: ICD-10-CM

## 2025-04-10 DIAGNOSIS — L82.1 SK (SEBORRHEIC KERATOSIS): ICD-10-CM

## 2025-04-10 DIAGNOSIS — L57.0 ACTINIC KERATOSIS: Primary | ICD-10-CM

## 2025-04-10 PROCEDURE — 11102 TANGNTL BX SKIN SINGLE LES: CPT | Performed by: DERMATOLOGY

## 2025-04-10 PROCEDURE — 17000 DESTRUCT PREMALG LESION: CPT | Performed by: DERMATOLOGY

## 2025-04-10 PROCEDURE — 99211 OFF/OP EST MAY X REQ PHY/QHP: CPT | Performed by: DERMATOLOGY

## 2025-04-10 PROCEDURE — 17003 DESTRUCT PREMALG LES 2-14: CPT | Performed by: DERMATOLOGY

## 2025-04-10 NOTE — PATIENT INSTRUCTIONS
Biopsy Wound Care Instructions    Keep the bandage in place for 24 hours.   Cleanse the wound with mild soapy water daily  Gently dry the area.  Apply Vaseline or petroleum jelly to the wound using a cotton tipped applicator.  Cover with a clean bandage.  Repeat this process until the biopsy site is healed.  If you had stitches placed, continue treating the site until the stitches are removed. Remember to make an appointment to return to have your stitches removed by our staff.  You may shower and bathe as usual.       ** Biopsy results generally take around 7 business days to come back.  If you have not heard from us by then, please call the office at [phone].    *Please note that biopsy results are released to both the patient and physician at the same time in [place].  Please allow time for your physician to review the results.  One of our staff members will reach out to you with the results and plan.

## 2025-04-10 NOTE — PROGRESS NOTES
per capsule TAKE 1 CAPSULE DAILY 90 capsule 3    triamcinolone (KENALOG) 0.1 % cream Apply to affected area twice daily for up to 2 weeks or until improved. 80 g 1    ketoconazole (NIZORAL) 2 % shampoo Use to wash the scalp 3 times weekly.  Leave on the scalp for 5 minutes prior to rinsing. 1 each 5    sildenafil (VIAGRA) 50 MG tablet Take 1 tablet by mouth as needed for Erectile Dysfunction 30 tablet 3    Glucosamine-Chondroitin (MOVE FREE PO) Take by mouth      Omega-3 Fatty Acids 1200 MG CAPS 1 EVERY DAY      Docusate Sodium 100 MG TABS 2 Every Morning      therapeutic multivitamin-minerals (THERAGRAN-M) tablet Take 1 tablet by mouth daily           Physical Examination       Well-appearing.    1.  Central forehead-2, left upper forehead-2, left medial cheek-1: Several keratotic erythematous macules papules.    2.  Right infraorbital region with a small pink papule with conical hyperkeratosis.    3.  Left anterior temple with a stuck on appearing verrucous tan papule.           Assessment and Plan     1. Actinic keratoses - 5    Cryotherapy was discussed and patient agreed to proceed.  Consent was obtained.  5 lesions were treated cryotherapy: Central forehead-2, left upper forehead-2, left medial cheek-1. 2 cycles of liquid nitrogen applied to each lesion for 5 seconds using a Cry-Ac cryo spray gun.  Patient was educated regarding the potential risks of blister formation and discomfort.  Wound care was discussed.  The patient tolerated the procedure well and there were no immediate complications.       2. Neoplasm of uncertain behavior of skin, right infraorbital region -hypertrophic actinic keratosis versus early SCC    Discussed possible diagnosis; patient agreeable to proceed with skin biopsy (written consent obtained).  Risks of the procedure were reviewed including discomfort, bleeding, scar and infection.      The area to be biopsied was marked with a surgical pen.  Alcohol was used to cleanse the site.

## 2025-04-15 ENCOUNTER — RESULTS FOLLOW-UP (OUTPATIENT)
Age: 70
End: 2025-04-15

## 2025-04-15 DIAGNOSIS — D09.9 SQUAMOUS CELL CARCINOMA IN SITU: Primary | ICD-10-CM

## 2025-04-15 LAB — DERMATOLOGY PATHOLOGY REPORT: ABNORMAL

## 2025-04-28 NOTE — PATIENT INSTRUCTIONS
If you are interested in pursuing hearing aids, here are the next steps:    Contact your insurance and ask, Do I have a benefit for hearing aids?   If the answer is no hearing aids will be a 100% out of pocket expense  If the answer is yes, ask Can I use this benefit at [facility]  or Where can I use this benefit?  If [facility] is not in-network for hearing aids, your insurance should be able to provide the appropriate contact information for an in-network provider. Call [facility] ([phone]) to schedule a Hearing Aid Evaluation   This appointment is when we will discuss hearing aid options and decide which device is most appropriate for you. Learning About Hearing Aids  What is a hearing aid? A hearing aid makes sounds louder. It can help some people with hearing problems to hear better. Hearing aids do not restore normal hearing. But they can make it easier to communicate by making sounds clearer. Digital programmable hearing aids can adjust themselves to work best where you are at any time. You also have more choices in setting them up than with analog hearing aids. There are also different styles of hearing aids. A behind-the-ear (BTE) hearing aid connects to a plastic ear mold that fits inside the outer ear. BTE hearing aids are used for all levels of hearing loss, especially very severe hearing loss. They may be better for children for safety and growth reasons. Poorly fitting BTE ear molds or a buildup of earwax may cause a whistling sound (feedback). An in-the-ear (ITE) hearing aid fits in the outer part of the ear. It can be used by people with mild to severe hearing loss. ITE hearing aids can be used with other hearing devices, such as a telecoil that improves hearing during phone calls. ITE hearing aids can be damaged by earwax and fluid draining from the ear. Their small size may be hard for some people to handle.  They are not often used in children because the English

## 2025-05-01 ENCOUNTER — PROCEDURE VISIT (OUTPATIENT)
Dept: SURGERY | Age: 70
End: 2025-05-01
Payer: MEDICARE

## 2025-05-01 VITALS — HEART RATE: 49 BPM

## 2025-05-01 DIAGNOSIS — D04.39 SQUAMOUS CELL CARCINOMA IN SITU (SCCIS) OF SKIN OF RIGHT CHEEK: Primary | ICD-10-CM

## 2025-05-01 PROCEDURE — 17311 MOHS 1 STAGE H/N/HF/G: CPT | Performed by: DERMATOLOGY

## 2025-05-01 PROCEDURE — 12052 INTMD RPR FACE/MM 2.6-5.0 CM: CPT | Performed by: DERMATOLOGY

## 2025-05-01 NOTE — PATIENT INSTRUCTIONS
[facility] Office Hours:    Monday-Thursday  7:30 AM-4:30 PM    Friday  9:00 AM-1:00 PM       POST-OPERATIVE CARE FOR LIQUID SKIN ADHESIVE             Bandage change after 24 hours    During your procedure today, a liquid skin adhesive was used to close the wound. You do not have to have stiches removed. If has a clear to light purple shiny surface. You do not have to have this removed. It will dissolve (melt away) in about 1-2 weeks. Please follow these instructions to help you recover from your procedure and help your wound heal.    CARING FOR YOUR SURGICAL SITE  The bandage should remain on and completely dry for 24 hours. Do NOT get the bandage wet.  After the first 24 hours, gently remove the remaining part of the bandage. It can be helpful to moisten the bandage edges in the shower.   Be gentle around the area of the wound. Do not scrub, rub or pick at the skin glue. It will gradually dissolve in 1-2 weeks.   Do not shave directly over the wound for one week. You can shave around the area.   After one week you can start cleaning the area gently and resume all normal activity. No further restrictions.  Use Sunscreen with SPF of at least 30 on the area around the wound.    If the dressing comes off or if you have questions, or concerns about the dressing, please call the office for instructions!    POST OPERATIVE INSTRUCTIONS    Activity: it is recommended to avoid strenuous activity such as lifting, pushing, pulling, running, power walking or contact sports for at least 2-7 days or as recommended by your provider.  Eating and drinking: Do not drink alcohol for 48 hours after your procedure. Alcohol increases the chances of bleeding.  Medicines   -If you have discomfort, take Acetaminophen (Tylenol or Extra Strength Tylenol). Follow the instructions and warning on the bottle.    Bleeding: If bleeding occurs, Put firm pressure on the area with gauze for 20 minutes without peeking. If the

## 2025-05-01 NOTE — PROGRESS NOTES
MOHS PROCEDURE NOTE    PHYSICIAN:  Tami Velazquez MD, Who operated in two distinct and integrated capacities as the surgeon removing the tissue and as the pathologist examining the tissue.    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     EBL:  MINIMAL    DURATION OF PROCEDURE:  1 HOUR    POSTOPERATIVE OBSERVATION: 0.5 HOUR    SPECIMENS:  SEE MOHS MAP    COMPLICATIONS:  NONE    DESCRIPTION OF PROCEDURE:  The patient was given a mirror, as appropriate, and the biopsy site was identified, marked with a surgical marking pen, and verified by the patient.   Options for treatment were discussed and the patient was informed that Mohs surgery was the selected treatment based on its lower recurrence rate, given the features listed above, as compared to other treatment modalities such as excision, radiation, or curettage, and agreed with this treatment plan.  Risks and benefits including bruising, swelling, bleeding, infection, nerve injury, recurrence, and scarring were discussed with the patient prior to the procedure and a written consent detailing these and other risks was reviewed with the patient and signed.    There was a time out for person and procedure verification.  The surgical site was prepped with an antiseptic solution.  Application of an antiseptic solution was repeated before each surgical stage.      Stage I:  The clinically-apparent tumor

## 2025-05-02 ENCOUNTER — TELEPHONE (OUTPATIENT)
Dept: SURGERY | Age: 70
End: 2025-05-02

## 2025-05-02 NOTE — TELEPHONE ENCOUNTER
The patient was in the office on 5/1/2025 for Mohs surgery located on the right infraorbital region with ILC repair.  The patient tolerated the procedure well and left the office in good condition.    Pain level on post-operative day 1:  none and level of pain (1-10, 10 severe), Denies pain.    Any bleeding episode that required pressure to be held, bandage change or a call to the office or MD?  no     Any other issues?:  no    A post-operative telephone call was placed at 9:42 am in order to check on the patient's recovery process.  The patient reported doing well and had no complaints other than those listed above, if any.  All of the patient's questions were answered.

## 2025-06-30 ENCOUNTER — OFFICE VISIT (OUTPATIENT)
Age: 70
End: 2025-06-30
Payer: MEDICARE

## 2025-06-30 DIAGNOSIS — L81.4 SOLAR LENTIGINOSIS: ICD-10-CM

## 2025-06-30 DIAGNOSIS — L82.1 SK (SEBORRHEIC KERATOSIS): ICD-10-CM

## 2025-06-30 DIAGNOSIS — Z85.828 HISTORY OF NONMELANOMA SKIN CANCER: ICD-10-CM

## 2025-06-30 DIAGNOSIS — L57.0 ACTINIC KERATOSIS: Primary | ICD-10-CM

## 2025-06-30 PROCEDURE — 99212 OFFICE O/P EST SF 10 MIN: CPT | Performed by: DERMATOLOGY

## 2025-06-30 PROCEDURE — 1160F RVW MEDS BY RX/DR IN RCRD: CPT | Performed by: DERMATOLOGY

## 2025-06-30 PROCEDURE — 99213 OFFICE O/P EST LOW 20 MIN: CPT | Performed by: DERMATOLOGY

## 2025-06-30 PROCEDURE — G8417 CALC BMI ABV UP PARAM F/U: HCPCS | Performed by: DERMATOLOGY

## 2025-06-30 PROCEDURE — 17000 DESTRUCT PREMALG LESION: CPT | Performed by: DERMATOLOGY

## 2025-06-30 PROCEDURE — 17003 DESTRUCT PREMALG LES 2-14: CPT | Performed by: DERMATOLOGY

## 2025-06-30 PROCEDURE — 3017F COLORECTAL CA SCREEN DOC REV: CPT | Performed by: DERMATOLOGY

## 2025-06-30 PROCEDURE — G8427 DOCREV CUR MEDS BY ELIG CLIN: HCPCS | Performed by: DERMATOLOGY

## 2025-06-30 PROCEDURE — 1123F ACP DISCUSS/DSCN MKR DOCD: CPT | Performed by: DERMATOLOGY

## 2025-06-30 PROCEDURE — 1159F MED LIST DOCD IN RCRD: CPT | Performed by: DERMATOLOGY

## 2025-06-30 PROCEDURE — 1036F TOBACCO NON-USER: CPT | Performed by: DERMATOLOGY

## 2025-06-30 NOTE — PROGRESS NOTES
[facility]  John Aguilar MD  [phone]      Keith Leroy  1955    69 y.o. male     Date of Visit: 6/30/2025    Chief Complaint: skin lesions of concern    History of Present Illness:    1.  He presents today for couple of persistent scaly lesions on the face.    2.  He has stable freckling on the extremities.    3.  He has few asymptomatic lesions on the upper trunk.    4.  He has a history of keratinocyte carcinomas, most recently with an SCC in situ on the right infraorbital region status post Mohs by Dr. Velazquez nearly 2 months ago.    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 Velazquez on 5/1/2025.  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 Velazquez.   History of SCC in situ on the back status post excision 10/2013.      Review of Systems:  Gen: Feels well, good sense of health.    Past Medical History, Family History, Surgical History, Medications and Allergies reviewed.    Past Medical History:   Diagnosis Date    Adjustment disorder with depressed mood 3/6/2018    Cancer (HCC)     skin : [facility] Dr. Middleton    Elevated liver enzymes 6/30/2014    Essential hypertension, benign     Lung mass 1/6/16    REpeat CT 4/19 improved  follows Dr. 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 Gray s/p chemo/rad     Past Surgical History:   Procedure Laterality Date    CHOLECYSTECTOMY      FOOT SURGERY      KNEE ARTHROSCOPY Right 01/21/2006    Dr. Leno: R knee     KNEE ARTHROSCOPY Left 01/01/2008    MOHS SURGERY  05/01/2025    right infraorbital region       Allergies   Allergen